# Patient Record
Sex: MALE | Race: WHITE | NOT HISPANIC OR LATINO | Employment: FULL TIME | URBAN - METROPOLITAN AREA
[De-identification: names, ages, dates, MRNs, and addresses within clinical notes are randomized per-mention and may not be internally consistent; named-entity substitution may affect disease eponyms.]

---

## 2018-09-26 ENCOUNTER — ANTICOAGULATION MONITORING (OUTPATIENT)
Dept: VASCULAR LAB | Facility: MEDICAL CENTER | Age: 38
End: 2018-09-26

## 2021-02-17 ENCOUNTER — OFFICE VISIT (OUTPATIENT)
Dept: URGENT CARE | Facility: PHYSICIAN GROUP | Age: 41
End: 2021-02-17

## 2021-02-17 ENCOUNTER — HOSPITAL ENCOUNTER (OUTPATIENT)
Facility: MEDICAL CENTER | Age: 41
End: 2021-02-18
Attending: EMERGENCY MEDICINE | Admitting: STUDENT IN AN ORGANIZED HEALTH CARE EDUCATION/TRAINING PROGRAM

## 2021-02-17 ENCOUNTER — APPOINTMENT (OUTPATIENT)
Dept: RADIOLOGY | Facility: MEDICAL CENTER | Age: 41
End: 2021-02-17
Attending: EMERGENCY MEDICINE

## 2021-02-17 VITALS
HEART RATE: 84 BPM | BODY MASS INDEX: 37.93 KG/M2 | TEMPERATURE: 98.5 F | HEIGHT: 72 IN | DIASTOLIC BLOOD PRESSURE: 90 MMHG | RESPIRATION RATE: 16 BRPM | WEIGHT: 280 LBS | OXYGEN SATURATION: 97 % | SYSTOLIC BLOOD PRESSURE: 128 MMHG

## 2021-02-17 DIAGNOSIS — M79.89 PAIN AND SWELLING OF LEFT LOWER LEG: ICD-10-CM

## 2021-02-17 DIAGNOSIS — R20.2 NUMBNESS AND TINGLING SENSATION OF SKIN: ICD-10-CM

## 2021-02-17 DIAGNOSIS — R04.0 EPISTAXIS: ICD-10-CM

## 2021-02-17 DIAGNOSIS — R51.9 INTERMITTENT HEADACHE: ICD-10-CM

## 2021-02-17 DIAGNOSIS — R20.0 NUMBNESS AND TINGLING SENSATION OF SKIN: ICD-10-CM

## 2021-02-17 DIAGNOSIS — H53.9 VISUAL DISTURBANCE: ICD-10-CM

## 2021-02-17 DIAGNOSIS — H53.9 VISUAL CHANGES: ICD-10-CM

## 2021-02-17 DIAGNOSIS — R20.0 LEFT FACIAL NUMBNESS: ICD-10-CM

## 2021-02-17 DIAGNOSIS — G43.109 COMPLICATED MIGRAINE: ICD-10-CM

## 2021-02-17 DIAGNOSIS — R51.9 NONINTRACTABLE HEADACHE, UNSPECIFIED CHRONICITY PATTERN, UNSPECIFIED HEADACHE TYPE: ICD-10-CM

## 2021-02-17 DIAGNOSIS — M79.662 PAIN AND SWELLING OF LEFT LOWER LEG: ICD-10-CM

## 2021-02-17 DIAGNOSIS — R07.9 NONSPECIFIC CHEST PAIN: ICD-10-CM

## 2021-02-17 DIAGNOSIS — R79.89 ELEVATED LFTS: ICD-10-CM

## 2021-02-17 PROBLEM — G45.9 TIA (TRANSIENT ISCHEMIC ATTACK): Status: ACTIVE | Noted: 2021-02-17

## 2021-02-17 LAB
ALBUMIN SERPL BCP-MCNC: 4.8 G/DL (ref 3.2–4.9)
ALBUMIN/GLOB SERPL: 1.8 G/DL
ALP SERPL-CCNC: 76 U/L (ref 30–99)
ALT SERPL-CCNC: 113 U/L (ref 2–50)
ANION GAP SERPL CALC-SCNC: 13 MMOL/L (ref 7–16)
APTT PPP: 30 SEC (ref 24.7–36)
AST SERPL-CCNC: 55 U/L (ref 12–45)
BASOPHILS # BLD AUTO: 0.8 % (ref 0–1.8)
BASOPHILS # BLD: 0.06 K/UL (ref 0–0.12)
BILIRUB SERPL-MCNC: 0.4 MG/DL (ref 0.1–1.5)
BUN SERPL-MCNC: 13 MG/DL (ref 8–22)
CALCIUM SERPL-MCNC: 9.5 MG/DL (ref 8.5–10.5)
CHLORIDE SERPL-SCNC: 102 MMOL/L (ref 96–112)
CO2 SERPL-SCNC: 23 MMOL/L (ref 20–33)
CREAT SERPL-MCNC: 0.94 MG/DL (ref 0.5–1.4)
EKG IMPRESSION: NORMAL
EOSINOPHIL # BLD AUTO: 0.31 K/UL (ref 0–0.51)
EOSINOPHIL NFR BLD: 3.9 % (ref 0–6.9)
ERYTHROCYTE [DISTWIDTH] IN BLOOD BY AUTOMATED COUNT: 42.5 FL (ref 35.9–50)
EST. AVERAGE GLUCOSE BLD GHB EST-MCNC: 131 MG/DL
FLUAV RNA SPEC QL NAA+PROBE: NEGATIVE
FLUBV RNA SPEC QL NAA+PROBE: NEGATIVE
GLOBULIN SER CALC-MCNC: 2.7 G/DL (ref 1.9–3.5)
GLUCOSE SERPL-MCNC: 83 MG/DL (ref 65–99)
HBA1C MFR BLD: 6.2 % (ref 0–5.6)
HCT VFR BLD AUTO: 50 % (ref 42–52)
HGB BLD-MCNC: 17.1 G/DL (ref 14–18)
IMM GRANULOCYTES # BLD AUTO: 0.03 K/UL (ref 0–0.11)
IMM GRANULOCYTES NFR BLD AUTO: 0.4 % (ref 0–0.9)
INR PPP: 0.99 (ref 0.87–1.13)
LYMPHOCYTES # BLD AUTO: 2.89 K/UL (ref 1–4.8)
LYMPHOCYTES NFR BLD: 36.8 % (ref 22–41)
MCH RBC QN AUTO: 30.1 PG (ref 27–33)
MCHC RBC AUTO-ENTMCNC: 34.2 G/DL (ref 33.7–35.3)
MCV RBC AUTO: 87.9 FL (ref 81.4–97.8)
MONOCYTES # BLD AUTO: 0.72 K/UL (ref 0–0.85)
MONOCYTES NFR BLD AUTO: 9.2 % (ref 0–13.4)
NEUTROPHILS # BLD AUTO: 3.84 K/UL (ref 1.82–7.42)
NEUTROPHILS NFR BLD: 48.9 % (ref 44–72)
NRBC # BLD AUTO: 0 K/UL
NRBC BLD-RTO: 0 /100 WBC
PLATELET # BLD AUTO: 288 K/UL (ref 164–446)
PMV BLD AUTO: 9.6 FL (ref 9–12.9)
POTASSIUM SERPL-SCNC: 4.4 MMOL/L (ref 3.6–5.5)
PROT SERPL-MCNC: 7.5 G/DL (ref 6–8.2)
PROTHROMBIN TIME: 13.4 SEC (ref 12–14.6)
RBC # BLD AUTO: 5.69 M/UL (ref 4.7–6.1)
RSV RNA SPEC QL NAA+PROBE: NEGATIVE
SARS-COV-2 RNA RESP QL NAA+PROBE: NOTDETECTED
SODIUM SERPL-SCNC: 138 MMOL/L (ref 135–145)
SPECIMEN SOURCE: NORMAL
TROPONIN T SERPL-MCNC: <6 NG/L (ref 6–19)
WBC # BLD AUTO: 7.9 K/UL (ref 4.8–10.8)

## 2021-02-17 PROCEDURE — G0378 HOSPITAL OBSERVATION PER HR: HCPCS

## 2021-02-17 PROCEDURE — 85730 THROMBOPLASTIN TIME PARTIAL: CPT

## 2021-02-17 PROCEDURE — 99285 EMERGENCY DEPT VISIT HI MDM: CPT

## 2021-02-17 PROCEDURE — 99204 OFFICE O/P NEW MOD 45 MIN: CPT | Performed by: PHYSICIAN ASSISTANT

## 2021-02-17 PROCEDURE — 70450 CT HEAD/BRAIN W/O DYE: CPT

## 2021-02-17 PROCEDURE — 93971 EXTREMITY STUDY: CPT | Mod: LT

## 2021-02-17 PROCEDURE — 85025 COMPLETE CBC W/AUTO DIFF WBC: CPT

## 2021-02-17 PROCEDURE — 83036 HEMOGLOBIN GLYCOSYLATED A1C: CPT

## 2021-02-17 PROCEDURE — 85610 PROTHROMBIN TIME: CPT

## 2021-02-17 PROCEDURE — 80053 COMPREHEN METABOLIC PANEL: CPT

## 2021-02-17 PROCEDURE — 99220 PR INITIAL OBSERVATION CARE,LEVL III: CPT | Performed by: STUDENT IN AN ORGANIZED HEALTH CARE EDUCATION/TRAINING PROGRAM

## 2021-02-17 PROCEDURE — 93971 EXTREMITY STUDY: CPT | Mod: 26,LT | Performed by: INTERNAL MEDICINE

## 2021-02-17 PROCEDURE — C9803 HOPD COVID-19 SPEC COLLECT: HCPCS | Performed by: EMERGENCY MEDICINE

## 2021-02-17 PROCEDURE — 0241U HCHG SARS-COV-2 COVID-19 NFCT DS RESP RNA 4 TRGT MIC: CPT

## 2021-02-17 PROCEDURE — 93005 ELECTROCARDIOGRAM TRACING: CPT | Performed by: EMERGENCY MEDICINE

## 2021-02-17 PROCEDURE — 84484 ASSAY OF TROPONIN QUANT: CPT

## 2021-02-17 RX ORDER — PROCHLORPERAZINE EDISYLATE 5 MG/ML
5-10 INJECTION INTRAMUSCULAR; INTRAVENOUS EVERY 4 HOURS PRN
Status: DISCONTINUED | OUTPATIENT
Start: 2021-02-17 | End: 2021-02-18 | Stop reason: HOSPADM

## 2021-02-17 RX ORDER — AMOXICILLIN 250 MG
2 CAPSULE ORAL 2 TIMES DAILY
Status: DISCONTINUED | OUTPATIENT
Start: 2021-02-17 | End: 2021-02-18 | Stop reason: HOSPADM

## 2021-02-17 RX ORDER — POLYETHYLENE GLYCOL 3350 17 G/17G
1 POWDER, FOR SOLUTION ORAL
Status: DISCONTINUED | OUTPATIENT
Start: 2021-02-17 | End: 2021-02-18 | Stop reason: HOSPADM

## 2021-02-17 RX ORDER — ONDANSETRON 2 MG/ML
4 INJECTION INTRAMUSCULAR; INTRAVENOUS EVERY 4 HOURS PRN
Status: DISCONTINUED | OUTPATIENT
Start: 2021-02-17 | End: 2021-02-18 | Stop reason: HOSPADM

## 2021-02-17 RX ORDER — BISACODYL 10 MG
10 SUPPOSITORY, RECTAL RECTAL
Status: DISCONTINUED | OUTPATIENT
Start: 2021-02-17 | End: 2021-02-18 | Stop reason: HOSPADM

## 2021-02-17 RX ORDER — PROMETHAZINE HYDROCHLORIDE 25 MG/1
12.5-25 SUPPOSITORY RECTAL EVERY 4 HOURS PRN
Status: DISCONTINUED | OUTPATIENT
Start: 2021-02-17 | End: 2021-02-18 | Stop reason: HOSPADM

## 2021-02-17 RX ORDER — PROMETHAZINE HYDROCHLORIDE 25 MG/1
12.5-25 TABLET ORAL EVERY 4 HOURS PRN
Status: DISCONTINUED | OUTPATIENT
Start: 2021-02-17 | End: 2021-02-18 | Stop reason: HOSPADM

## 2021-02-17 RX ORDER — ACETAMINOPHEN 325 MG/1
650 TABLET ORAL EVERY 6 HOURS PRN
Status: DISCONTINUED | OUTPATIENT
Start: 2021-02-17 | End: 2021-02-18 | Stop reason: HOSPADM

## 2021-02-17 RX ORDER — IBUPROFEN 800 MG/1
800 TABLET ORAL EVERY 8 HOURS PRN
COMMUNITY

## 2021-02-17 RX ORDER — ONDANSETRON 4 MG/1
4 TABLET, ORALLY DISINTEGRATING ORAL EVERY 4 HOURS PRN
Status: DISCONTINUED | OUTPATIENT
Start: 2021-02-17 | End: 2021-02-18 | Stop reason: HOSPADM

## 2021-02-17 RX ORDER — LABETALOL HYDROCHLORIDE 5 MG/ML
10 INJECTION, SOLUTION INTRAVENOUS EVERY 4 HOURS PRN
Status: DISCONTINUED | OUTPATIENT
Start: 2021-02-17 | End: 2021-02-18 | Stop reason: HOSPADM

## 2021-02-17 ASSESSMENT — ENCOUNTER SYMPTOMS
ORTHOPNEA: 0
SENSORY CHANGE: 1
BLOOD IN STOOL: 0
PHOTOPHOBIA: 0
NAUSEA: 0
MYALGIAS: 0
CONSTIPATION: 0
SENSORY CHANGE: 1
LOSS OF CONSCIOUSNESS: 0
EYE REDNESS: 0
EYE PAIN: 0
VISUAL CHANGE: 1
PALPITATIONS: 0
HEADACHES: 1
DIZZINESS: 1
SHORTNESS OF BREATH: 0
TINGLING: 1
COUGH: 0
ABDOMINAL PAIN: 0
WHEEZING: 0
FEVER: 0
EYE DISCHARGE: 0
DIARRHEA: 0
VOMITING: 0
CHILLS: 0
LOSS OF CONSCIOUSNESS: 0
WEAKNESS: 0
DIZZINESS: 0
COUGH: 0
FOCAL WEAKNESS: 0
BLURRED VISION: 1
HEADACHES: 1
WEIGHT LOSS: 0
DOUBLE VISION: 0
HEMOPTYSIS: 0
SORE THROAT: 0

## 2021-02-17 ASSESSMENT — PAIN DESCRIPTION - PAIN TYPE: TYPE: ACUTE PAIN

## 2021-02-17 NOTE — ED NOTES
Charge notified of symptomatic presentations and tentative diagnosis by UC. All symptomatic presentations outside 24hr window, UC did not definitively diagnose. Pt does not meet stroke protocol.

## 2021-02-17 NOTE — PROGRESS NOTES
Subjective:      Jack Piedra is a 40 y.o. male who presents with Headache (Frequent headache, fogginess and nose bleeds.  COVID positive the first of January.)            Patient is a 40-year-old male who presents to urgent care with several complaints today.      Patient reports remote history of a few bloody noses in particular to the left nostril.  Reports it is often instigated with a light headache followed by spontaneous bleeding through the left nare.  He reports this started approximately 1 week ago.  For the last 3 days he has become gradually more concerned as he has developed a left-sided headache that has been intermittent in nature however with associated fogginess and decreased vision to his periphery bilaterally.  He also reports during this time he has developed numbness and tingling-more numbness to his left cheek.  He denies any weakness or slurring of his words.  He also denies associated chest pain, shortness of breath.  When asked about previous history of strokes, clots etc-patient does report a remote episode of left knee pain, swelling and calf pain and swelling 3 weeks ago which subsequently improved after utilization of ibuprofen.  Currently denies any left knee or calf pain.    Headache   This is a new problem. Episode onset: 3 days ago. The problem occurs intermittently. The problem has been waxing and waning. The pain is located in the bilateral (Mostly on the left however can be bilateral) region. The pain radiates to the face. The pain quality is not similar to prior headaches. The quality of the pain is described as shooting. Associated symptoms include dizziness, tingling and a visual change. Pertinent negatives include no coughing, eye redness, phonophobia or photophobia. Exacerbated by: Head movement, leaning over. He has tried NSAIDs for the symptoms. The treatment provided mild relief.   Patient also reports prior history of COVID-19 which was diagnosed at the beginning of  January.    Review of Systems   Constitutional: Positive for malaise/fatigue.   Eyes: Negative for double vision, photophobia, discharge and redness.        Peripheral visual changes   Respiratory: Negative for cough.    Cardiovascular: Negative for chest pain.        Prior left knee and left calf swelling   Neurological: Positive for dizziness, tingling, sensory change and headaches. Negative for focal weakness and loss of consciousness.   All other systems reviewed and are negative.         Objective:     /90   Pulse 84   Temp 36.9 °C (98.5 °F)   Resp 16   Ht 1.829 m (6')   Wt (!) 127 kg (280 lb)   SpO2 97%   BMI 37.97 kg/m²    PMH:  has no past medical history on file.  MEDS: Reviewed .   ALLERGIES:   Allergies   Allergen Reactions   • Augmentin      SURGHX: No past surgical history on file.  SOCHX:  reports that he has been smoking cigarettes. He has been smoking about 0.50 packs per day. His smokeless tobacco use includes chew.  FH: Family history was reviewed, no pertinent findings to report    Physical Exam  Vitals reviewed.   Constitutional:       General: He is not in acute distress.     Appearance: He is well-developed.   HENT:      Head: Normocephalic and atraumatic.      Right Ear: External ear normal.      Left Ear: External ear normal.      Nose:      Comments: Dried blood to the left nare with noted scab to the inner septum without active bleeding.  No blood noted to posterior oropharynx.     Mouth/Throat:      Pharynx: No oropharyngeal exudate.   Eyes:      Conjunctiva/sclera: Conjunctivae normal.      Pupils: Pupils are equal, round, and reactive to light.   Neck:      Trachea: No tracheal deviation.   Cardiovascular:      Rate and Rhythm: Normal rate and regular rhythm.      Heart sounds: Murmur present.   Pulmonary:      Effort: Pulmonary effort is normal. No respiratory distress.      Breath sounds: Normal breath sounds.   Musculoskeletal:         General: Normal range of motion.       Cervical back: Normal range of motion and neck supple.   Skin:     General: Skin is warm.      Findings: No rash.   Neurological:      Mental Status: He is alert and oriented to person, place, and time.      Cranial Nerves: No facial asymmetry.      Sensory: Sensory deficit present.      Coordination: Coordination normal.      Gait: Gait normal.      Comments: Decreased sensation over the left maxillary region.Unable to elicit visual field deficit   Psychiatric:         Behavior: Behavior normal.         Thought Content: Thought content normal.         Judgment: Judgment normal.                 Assessment/Plan:        1. Intermittent headache  2. Numbness and tingling sensation of skin  3. Epistaxis  4. Visual changes    At this time patient is well-appearing, without active bleeding although remote history of epistaxis.  Currently without headache however still with slight numbness and tingling to the left maxillary region.  Reports prior history of visual changes over the last 2 to 3 days.  Without carotid bruit although patient is with murmur.  Questionable migraine, elevated transient BP,  TIA versus other intracranial abnormality-patient does not want to go back to [facility] for further evaluation at this time and is opting to pursue further evaluation in the emergency room.  I do feel that this is reasonable at this time- although symptoms started on Monday-he is to meet up with family member-friend will also accompany him to the emergency room.  Patient declines [facility] transport.  Patient was stable throughout the duration of his care today.    Please note that this dictation was created using voice recognition software. I have made every reasonable attempt to correct obvious errors, but I expect that there are errors of grammar and possibly content that I did not discover before finalizing the note.

## 2021-02-17 NOTE — ED TRIAGE NOTES
Jack Piedra  40 y.o. male  Chief Complaint   Patient presents with   • Sent from Urgent Care     For possible TIA. Pt states that he started having a headache 2/15 with nosebleeds, last nose bleed this AM. Pt states headache start on left side. Pt states that he had a swollen/painfull calve 2 weeks ago absolving approx. 3 days later. Pt states lethargic on 2/13. Pt complains of blurred vision, and hazy memory.       Pt ambulatory to triage with steady gait for above complaint.   Pt is alert and oriented, speaking in full sentences, follows commands and responds appropriately to questions. Resp are even and unlabored. No behavioral indicators of pain.   Pt placed in lobby. Pt educated on triage process. Pt encouraged to alert staff for any changes. This RN masked and in appropriate PPE during encounter.

## 2021-02-18 ENCOUNTER — APPOINTMENT (OUTPATIENT)
Dept: CARDIOLOGY | Facility: MEDICAL CENTER | Age: 41
End: 2021-02-18
Attending: STUDENT IN AN ORGANIZED HEALTH CARE EDUCATION/TRAINING PROGRAM

## 2021-02-18 ENCOUNTER — APPOINTMENT (OUTPATIENT)
Dept: RADIOLOGY | Facility: MEDICAL CENTER | Age: 41
End: 2021-02-18
Attending: STUDENT IN AN ORGANIZED HEALTH CARE EDUCATION/TRAINING PROGRAM

## 2021-02-18 VITALS
RESPIRATION RATE: 20 BRPM | BODY MASS INDEX: 40.01 KG/M2 | HEIGHT: 72 IN | TEMPERATURE: 98.1 F | SYSTOLIC BLOOD PRESSURE: 133 MMHG | OXYGEN SATURATION: 96 % | DIASTOLIC BLOOD PRESSURE: 80 MMHG | HEART RATE: 83 BPM | WEIGHT: 295.42 LBS

## 2021-02-18 PROBLEM — R73.03 PREDIABETES: Status: ACTIVE | Noted: 2021-02-18

## 2021-02-18 PROBLEM — G43.109 COMPLICATED MIGRAINE: Status: ACTIVE | Noted: 2021-02-17

## 2021-02-18 LAB
ANION GAP SERPL CALC-SCNC: 10 MMOL/L (ref 7–16)
BASOPHILS # BLD AUTO: 0.9 % (ref 0–1.8)
BASOPHILS # BLD: 0.06 K/UL (ref 0–0.12)
BUN SERPL-MCNC: 15 MG/DL (ref 8–22)
CALCIUM SERPL-MCNC: 8.7 MG/DL (ref 8.5–10.5)
CHLORIDE SERPL-SCNC: 102 MMOL/L (ref 96–112)
CHOLEST SERPL-MCNC: 140 MG/DL (ref 100–199)
CO2 SERPL-SCNC: 23 MMOL/L (ref 20–33)
CREAT SERPL-MCNC: 0.83 MG/DL (ref 0.5–1.4)
EOSINOPHIL # BLD AUTO: 0.3 K/UL (ref 0–0.51)
EOSINOPHIL NFR BLD: 4.4 % (ref 0–6.9)
ERYTHROCYTE [DISTWIDTH] IN BLOOD BY AUTOMATED COUNT: 41.9 FL (ref 35.9–50)
GLUCOSE SERPL-MCNC: 107 MG/DL (ref 65–99)
HCT VFR BLD AUTO: 46.1 % (ref 42–52)
HDLC SERPL-MCNC: 34 MG/DL
HGB BLD-MCNC: 15.9 G/DL (ref 14–18)
IMM GRANULOCYTES # BLD AUTO: 0.04 K/UL (ref 0–0.11)
IMM GRANULOCYTES NFR BLD AUTO: 0.6 % (ref 0–0.9)
LDLC SERPL CALC-MCNC: 75 MG/DL
LV EJECT FRACT  99904: 60
LV EJECT FRACT MOD 2C 99903: 67.41
LV EJECT FRACT MOD 4C 99902: 55.91
LV EJECT FRACT MOD BP 99901: 59.5
LYMPHOCYTES # BLD AUTO: 2.41 K/UL (ref 1–4.8)
LYMPHOCYTES NFR BLD: 35.5 % (ref 22–41)
MAGNESIUM SERPL-MCNC: 2.2 MG/DL (ref 1.5–2.5)
MCH RBC QN AUTO: 29.7 PG (ref 27–33)
MCHC RBC AUTO-ENTMCNC: 34.5 G/DL (ref 33.7–35.3)
MCV RBC AUTO: 86 FL (ref 81.4–97.8)
MONOCYTES # BLD AUTO: 0.65 K/UL (ref 0–0.85)
MONOCYTES NFR BLD AUTO: 9.6 % (ref 0–13.4)
NEUTROPHILS # BLD AUTO: 3.33 K/UL (ref 1.82–7.42)
NEUTROPHILS NFR BLD: 49 % (ref 44–72)
NRBC # BLD AUTO: 0 K/UL
NRBC BLD-RTO: 0 /100 WBC
PLATELET # BLD AUTO: 274 K/UL (ref 164–446)
PMV BLD AUTO: 9 FL (ref 9–12.9)
POTASSIUM SERPL-SCNC: 4 MMOL/L (ref 3.6–5.5)
RBC # BLD AUTO: 5.36 M/UL (ref 4.7–6.1)
SODIUM SERPL-SCNC: 135 MMOL/L (ref 135–145)
TRIGL SERPL-MCNC: 156 MG/DL (ref 0–149)
WBC # BLD AUTO: 6.8 K/UL (ref 4.8–10.8)

## 2021-02-18 PROCEDURE — 80061 LIPID PANEL: CPT

## 2021-02-18 PROCEDURE — 93306 TTE W/DOPPLER COMPLETE: CPT | Mod: 26 | Performed by: INTERNAL MEDICINE

## 2021-02-18 PROCEDURE — G0378 HOSPITAL OBSERVATION PER HR: HCPCS

## 2021-02-18 PROCEDURE — 700102 HCHG RX REV CODE 250 W/ 637 OVERRIDE(OP): Performed by: STUDENT IN AN ORGANIZED HEALTH CARE EDUCATION/TRAINING PROGRAM

## 2021-02-18 PROCEDURE — RXMED WILLOW AMBULATORY MEDICATION CHARGE: Performed by: NURSE PRACTITIONER

## 2021-02-18 PROCEDURE — 99217 PR OBSERVATION CARE DISCHARGE: CPT | Performed by: STUDENT IN AN ORGANIZED HEALTH CARE EDUCATION/TRAINING PROGRAM

## 2021-02-18 PROCEDURE — 80048 BASIC METABOLIC PNL TOTAL CA: CPT

## 2021-02-18 PROCEDURE — 85025 COMPLETE CBC W/AUTO DIFF WBC: CPT

## 2021-02-18 PROCEDURE — 70551 MRI BRAIN STEM W/O DYE: CPT

## 2021-02-18 PROCEDURE — A9270 NON-COVERED ITEM OR SERVICE: HCPCS | Performed by: STUDENT IN AN ORGANIZED HEALTH CARE EDUCATION/TRAINING PROGRAM

## 2021-02-18 PROCEDURE — 93306 TTE W/DOPPLER COMPLETE: CPT

## 2021-02-18 PROCEDURE — 83735 ASSAY OF MAGNESIUM: CPT

## 2021-02-18 RX ORDER — SUMATRIPTAN 25 MG/1
25-100 TABLET, FILM COATED ORAL
Qty: 30 TABLET | Refills: 3 | Status: SHIPPED
Start: 2021-02-18 | End: 2021-03-04

## 2021-02-18 RX ORDER — BUTALBITAL, ACETAMINOPHEN, CAFFEINE AND CODEINE PHOSPHATE 50; 325; 40; 30 MG/1; MG/1; MG/1; MG/1
1 CAPSULE ORAL EVERY 4 HOURS PRN
Qty: 30 CAPSULE | Refills: 0 | Status: SHIPPED | OUTPATIENT
Start: 2021-02-18 | End: 2021-02-18 | Stop reason: SDUPTHER

## 2021-02-18 RX ORDER — BUTALBITAL, ACETAMINOPHEN, CAFFEINE AND CODEINE PHOSPHATE 50; 325; 40; 30 MG/1; MG/1; MG/1; MG/1
1 CAPSULE ORAL EVERY 4 HOURS PRN
Qty: 30 CAPSULE | Refills: 0 | Status: SHIPPED | OUTPATIENT
Start: 2021-02-18 | End: 2021-02-18

## 2021-02-18 RX ADMIN — ACETAMINOPHEN 650 MG: 325 TABLET ORAL at 11:56

## 2021-02-18 ASSESSMENT — PATIENT HEALTH QUESTIONNAIRE - PHQ9
2. FEELING DOWN, DEPRESSED, IRRITABLE, OR HOPELESS: NOT AT ALL
1. LITTLE INTEREST OR PLEASURE IN DOING THINGS: NOT AT ALL
SUM OF ALL RESPONSES TO PHQ9 QUESTIONS 1 AND 2: 0

## 2021-02-18 ASSESSMENT — FIBROSIS 4 INDEX: FIB4 SCORE: 0.74

## 2021-02-18 ASSESSMENT — LIFESTYLE VARIABLES
ON A TYPICAL DAY WHEN YOU DRINK ALCOHOL HOW MANY DRINKS DO YOU HAVE: 0
HOW MANY TIMES IN THE PAST YEAR HAVE YOU HAD 5 OR MORE DRINKS IN A DAY: 0
EVER HAD A DRINK FIRST THING IN THE MORNING TO STEADY YOUR NERVES TO GET RID OF A HANGOVER: NO
EVER FELT BAD OR GUILTY ABOUT YOUR DRINKING: NO
HAVE YOU EVER FELT YOU SHOULD CUT DOWN ON YOUR DRINKING: NO
CONSUMPTION TOTAL: NEGATIVE
ALCOHOL_USE: NO
AVERAGE NUMBER OF DAYS PER WEEK YOU HAVE A DRINK CONTAINING ALCOHOL: 0
TOTAL SCORE: 0
TOTAL SCORE: 0
DOES PATIENT WANT TO STOP DRINKING: NO
TOTAL SCORE: 0
HAVE PEOPLE ANNOYED YOU BY CRITICIZING YOUR DRINKING: NO

## 2021-02-18 ASSESSMENT — PAIN DESCRIPTION - PAIN TYPE
TYPE: ACUTE PAIN
TYPE: ACUTE PAIN

## 2021-02-18 NOTE — DISCHARGE INSTRUCTIONS
Discharge Instructions    Discharged to home by car with relative. Discharged via wheelchair, hospital escort: Yes.  Special equipment needed: Not Applicable    Be sure to schedule a follow-up appointment with your primary care doctor or any specialists as instructed.     Discharge Plan:   Diet Plan: Discussed  Activity Level: Discussed  Confirmed Follow up Appointment: Patient to Call and Schedule Appointment  Confirmed Symptoms Management: Discussed  Medication Reconciliation Updated: No (Comments)  Influenza Vaccine Indication: Patient Refuses    I understand that a diet low in cholesterol, fat, and sodium is recommended for good health. Unless I have been given specific instructions below for another diet, I accept this instruction as my diet prescription.   Other diet: Regular    Special Instructions: None    · Is patient discharged on Warfarin / Coumadin?   No     Depression / Suicide Risk    As you are discharged from [facility], it is important to learn how to keep safe from harming yourself.    Recognize the warning signs:  · Abrupt changes in personality, positive or negative- including increase in energy   · Giving away possessions  · Change in eating patterns- significant weight changes-  positive or negative  · Change in sleeping patterns- unable to sleep or sleeping all the time   · Unwillingness or inability to communicate  · Depression  · Unusual sadness, discouragement and loneliness  · Talk of wanting to die  · Neglect of personal appearance   · Rebelliousness- reckless behavior  · Withdrawal from people/activities they love  · Confusion- inability to concentrate     If you or a loved one observes any of these behaviors or has concerns about self-harm, here's what you can do:  · Talk about it- your feelings and reasons for harming yourself  · Remove any means that you might use to hurt yourself (examples: pills, rope, extension cords, firearm)  · Get professional help from the  community (Mental Health, Substance Abuse, psychological counseling)  · Do not be alone:Call your Safe Contact- someone whom you trust who will be there for you.  · Call your local CRISIS HOTLINE 033-4092 or 797-845-7538  · Call your local Children's Mobile Crisis Response Team [place] (612) 535-2356 or www.[employer]  · Call the toll free National Suicide Prevention Hotlines   · National Suicide Prevention Lifeline 926-987-WIAA (6094)  · National Hope Line Network 800-SUICIDE (222-1756)

## 2021-02-18 NOTE — ED NOTES
Med Rec completed per patient at bedside  Allergies reviewed:NKDA  No ORAL antibiotics in last 14 days    Patient reports no prescription or OTC vitamins.    Patient did report he is prescribed ibu 800 mg TID PRN but seldomly takes because he does not like the way it makes him feel however he did have one dose today.

## 2021-02-18 NOTE — ED NOTES
"Patient to desk taking blood pressure cuff off stating that he is leaving and going someplace else because he's been waiting.  I apologized for the delay and he cut me off mid sentence and said \"My head is killing me and I can taste blood.  I am leaving\"  When asked to please sign form that he left before he saw a dr he waved his hand at me and walked off.   " Statement Selected

## 2021-02-18 NOTE — DISCHARGE SUMMARY
Discharge Summary    CHIEF COMPLAINT ON ADMISSION  Chief Complaint   Patient presents with   • Sent from Urgent Care     For possible TIA. Pt states that he started having a headache 2/15 with nosebleeds, last nose bleed this AM. Pt states headache start on left side. Pt states that he had a swollen/painfull calve 2 weeks ago absolving approx. 3 days later. Pt states lethargic on 2/13. Pt complains of blurred vision, and hazy memory.       Reason for Admission  Sent by      Admission Date  2/17/2021    CODE STATUS  Full Code    HPI & HOSPITAL COURSE  This is a 40 y.o. male with no significant past medical history here with complaints of persistent headache with intermittent blurry vision for approximately 5 days prior to admission.  He originally presented to urgent care and was recommended to present to ED for concern of possible TIA.  The patient also had additional complaints of left lower extremity swelling for approximately 1 week.    On admission CT head and MRI brain were negative for acute intracranial abnormalities.  Echocardiogram revealed an LVEF of 60% with no acute abnormalities.  At this time TIA is not suspected.  He is likely experiencing complicated migraines.  At this time his symptoms are resolved.  He will be prescribed Fioricet as needed and will have a neurology outpatient referral for further management of migraines.    He underwent left lower extremity ultrasound which was negative for DVT.  There does not appear to be infection of the left leg.  Differential diagnosis could include venous insufficiency.  This can be further worked up as an outpatient as there is not appear to be an acute issue at this time.    He was also noted to have prediabetes with hemoglobin A1c of 6.2.  We will not start medication for this at this time.  He is recommended to undergo lifestyle changes including weight loss and diet modification.  Further recommendations can be made per outpatient PCP.    At this time  the patient has no further need for acute intervention.  He is safe for discharge home with outpatient follow-up.    Therefore, he is discharged in good and stable condition to home with close outpatient follow-up.    Discharge Date  2/18/2021    FOLLOW UP ITEMS POST DISCHARGE  -Take Fioricet as needed for migraines.  -Follow with neurology in the outpatient setting for migraine treatment.  -Recommend lifestyle changes of weight loss and diet modifications for prediabetes.  Follow with PCP for further recommendations.    DISCHARGE DIAGNOSES  Principal Problem:    Complicated migraine POA: Yes  Active Problems:    Left leg swelling POA: Yes    Prediabetes POA: Unknown  Resolved Problems:    * No resolved hospital problems. *      FOLLOW UP  Follow with PCP 1 to 2 weeks.    MEDICATIONS ON DISCHARGE     Medication List      START taking these medications      Instructions   butalbital-acetaminophen-caffeine-codeine -79-30 MG per capsule  Commonly known as: FIORICET W/CODEINE   Take 1 capsule by mouth every four hours as needed for Migraine for up to 30 days.  Dose: 1 capsule        CONTINUE taking these medications      Instructions   ibuprofen 800 MG Tabs  Commonly known as: MOTRIN   Take 800 mg by mouth every 8 hours as needed (pain).  Dose: 800 mg            Allergies  Allergies   Allergen Reactions   • Augmentin Unspecified     unknown       DIET  Orders Placed This Encounter   Procedures   • Diet Order Diet: Regular     Standing Status:   Standing     Number of Occurrences:   1     Order Specific Question:   Diet:     Answer:   Regular [1]       ACTIVITY  As tolerated.  Weight bearing as tolerated    LABORATORY  Lab Results   Component Value Date    SODIUM 135 02/18/2021    POTASSIUM 4.0 02/18/2021    CHLORIDE 102 02/18/2021    CO2 23 02/18/2021    GLUCOSE 107 (H) 02/18/2021    BUN 15 02/18/2021    CREATININE 0.83 02/18/2021        Lab Results   Component Value Date    WBC 6.8 02/18/2021    HEMOGLOBIN 15.9  02/18/2021    HEMATOCRIT 46.1 02/18/2021    PLATELETCT 274 02/18/2021

## 2021-02-18 NOTE — ED NOTES
Pt ambulated to blue 21. Steady gait. Hooked up to monitor. Labs sent.    Pt pleasant and cooperative in regards to POC.

## 2021-02-18 NOTE — ASSESSMENT & PLAN NOTE
Left lower extremity swelling since Covid diagnosis  No signs of DVT on ultrasound imaging  No obvious signs of infection on physical examination  Minimal pain on palpation  Patient reports is improving  Unknown cause of swelling at this time  -Continue with ibuprofen  -Monitor    VTE: SCDs, Lovenox

## 2021-02-18 NOTE — ASSESSMENT & PLAN NOTE
Persistent headaches since Saturday, with associated vision and sensory change  No prior history of CVA in the past, denies any current comorbidities  Denies any prior history of headaches or migraines in the past  CT imaging without significant findings  Unclear if possible TIA versus atypical migraines  -F/U echo  -F/U MRI brain  -Order A1c and lipid panel  -Telemetry monitor  -Neurochecks every 4 hours

## 2021-02-18 NOTE — CARE PLAN
Problem: Safety:  Goal: Will remain free from injury  Outcome: PROGRESSING AS EXPECTED     Problem: Safety:  Goal: Risk of aspiration will decrease  Outcome: PROGRESSING AS EXPECTED     Call light and personal belongings within reach. Pt denies any needs at this time. Will continue to monitor.

## 2021-02-18 NOTE — ED PROVIDER NOTES
ED Provider Note     Scribed for Nicole Corea D.O. by Funmi Somers. 2/17/2021, 5:52 PM.     Primary care provider: Pcp Pt States None  Means of arrival: Walk in         History obtained from: Patient   History limited by: None    CHIEF COMPLAINT  Chief Complaint   Patient presents with   • Sent from Urgent Care     For possible TIA. Pt states that he started having a headache 2/15 with nosebleeds, last nose bleed this AM. Pt states headache start on left side. Pt states that he had a swollen/painfull calve 2 weeks ago absolving approx. 3 days later. Pt states lethargic on 2/13. Pt complains of blurred vision, and hazy memory.       HPI  Jack Piedra is a 40 y.o. male who presents to the emergency Department for evaluation from urgent care. Patient reports he began having headaches on Saturday localized behind his left ear. During the headaches his vision appears fuzzy and diminished. Patient has additional complaints of lethargy and left sided facial numbness. He also reports he has been having episodes of epistaxis that began Monday. 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 While driving today to be seen patient had another episode of headache and nose bleed. He denies any other nasal drainage, or tingling or weakness in his extremities. He reports some chest pain when he left urgent care today but states it is probably related to anxiety. Patient is not anticoagulated.     Patient has an additional concern for left leg symptoms. Patient reports he was Covid positive Jan 4th. Only covid symptoms were body aches loss of taste and smell and resolved with no problem. One week later he developed pain behind his left knee with swelling and calf tenderness. He sought medical care but they wanted to refer him to orthopedics. Patient took ibuprofen and after a few days the swelling resolved on its own. He denies any recent surgery or long sedentary periods.  Patient denies a history of seizure, cancer, stoke, heart issues, diabetes. Reports great grandfather dies of stroke at old age.     REVIEW OF SYSTEMS  Pertinent positives include headache, vision changes, nose bleed, left knee pain and swelling, left calf tenderness. Pertinent negatives include no nasal drainage, or tingling or weakness in his extremities.   See HPI for further details. All other systems are negative.    PAST MEDICAL HISTORY  History reviewed. No pertinent past medical history.    FAMILY HISTORY  History reviewed. No pertinent family history.    SOCIAL HISTORY  Social History     Tobacco Use   • Smoking status: Current Every Day Smoker     Packs/day: 0.50     Types: Cigarettes   • Smokeless tobacco: Current User     Types: Chew   Substance Use Topics   • Alcohol use: Not on file   • Drug use: Not on file      Social History     Substance and Sexual Activity   Drug Use Not on file       SURGICAL HISTORY  History reviewed. No pertinent surgical history.    CURRENT MEDICATIONS    Current Facility-Administered Medications:   •  senna-docusate (PERICOLACE or SENOKOT S) 8.6-50 MG per tablet 2 tablet, 2 tablet, Oral, BID **AND** polyethylene glycol/lytes (MIRALAX) PACKET 1 Packet, 1 Packet, Oral, QDAY PRN **AND** magnesium hydroxide (MILK OF MAGNESIA) suspension 30 mL, 30 mL, Oral, QDAY PRN **AND** bisacodyl (DULCOLAX) suppository 10 mg, 10 mg, Rectal, QDAY PRN, Misha Angeles M.D.  •  [START ON 2/18/2021] enoxaparin (LOVENOX) inj 40 mg, 40 mg, Subcutaneous, DAILY, Misha Angeles M.D.  •  acetaminophen (Tylenol) tablet 650 mg, 650 mg, Oral, Q6HRS PRN, Misha Angeles M.D.  •  labetalol (NORMODYNE/TRANDATE) injection 10 mg, 10 mg, Intravenous, Q4HRS PRN, Misha Angeles M.D.  •  ondansetron (ZOFRAN) syringe/vial injection 4 mg, 4 mg, Intravenous, Q4HRS PRN, Misha Angeles M.D.  •  ondansetron (ZOFRAN ODT) dispertab 4 mg, 4 mg, Oral, Q4HRS PRN, Misha Angeles M.D.  •  promethazine (PHENERGAN) tablet 12.5-25 mg, 12.5-25 mg, Oral,  Q4HRS PRN, Misha Angeles M.D.  •  promethazine (PHENERGAN) suppository 12.5-25 mg, 12.5-25 mg, Rectal, Q4HRS PRN, Misha Angeles M.D.  •  prochlorperazine (COMPAZINE) injection 5-10 mg, 5-10 mg, Intravenous, Q4HRS PRN, Misha Angeles M.D.    Current Outpatient Medications:   •  ibuprofen (MOTRIN) 800 MG Tab, Take 800 mg by mouth every 8 hours as needed (pain)., Disp: , Rfl:     ALLERGIES  Allergies   Allergen Reactions   • Augmentin Unspecified     unknown       PHYSICAL EXAM  VITAL SIGNS: /110   Pulse 83   Temp 36.1 °C (96.9 °F) (Temporal)   Resp 14   Ht 1.829 m (6')   Wt (!) 134 kg (294 lb 15.6 oz)   SpO2 94%   BMI 40.01 kg/m²   Constitutional: Patient is well developed, well nourished. Mild distress.   HENT: Normocephalic, atraumatic. Moist mucous membranes.    Eyes: PERRL, EOMI   Neck: Supple . Normal range of motion in flexion, extension and lateral rotation. No tenderness along the bony prominences or paraspinal muscles.  Lymphatic: No lymphadenopathy noted.   Cardiovascular: Normal heart rate and Regular rhythm. No murmur  Thorax & Lungs: Clear and equal breath sounds with good excursion. No respiratory distress, no rhonchi, wheezing or rales.   Abdomen: Bowel sounds normal in all four quadrants. Soft,nontender, no rebound , guarding, palpable masses.   Skin: Warm, Dry   Back: No cervical, thoracic, or lumbosacral tenderness.   Extremities: Peripheral pulses 4/4 left leg with 3+ edema, nonpitting, no erythema or warmth.  Positive calf tenderness.  Musculoskeletal: Normal range of motion in all major joints.   Neurologic: Alert & oriented x 3, Normal motor function, Normal sensory function, DTR's 4/4 bilaterally. Normal grasp, biceps, triceps, quadricepts, extensor hallucis longus, ankle plantar flexion. Subjective left facial paraesthesia. No facial droop.    Psychiatric: Affect normal, Judgment normal, Mood normal.     DIAGNOSTICS/PROCEDURES    LABS  Results for orders placed or performed during the  hospital encounter of 02/17/21   CBC WITH DIFFERENTIAL   Result Value Ref Range    WBC 7.9 4.8 - 10.8 K/uL    RBC 5.69 4.70 - 6.10 M/uL    Hemoglobin 17.1 14.0 - 18.0 g/dL    Hematocrit 50.0 42.0 - 52.0 %    MCV 87.9 81.4 - 97.8 fL    MCH 30.1 27.0 - 33.0 pg    MCHC 34.2 33.7 - 35.3 g/dL    RDW 42.5 35.9 - 50.0 fL    Platelet Count 288 164 - 446 K/uL    MPV 9.6 9.0 - 12.9 fL    Neutrophils-Polys 48.90 44.00 - 72.00 %    Lymphocytes 36.80 22.00 - 41.00 %    Monocytes 9.20 0.00 - 13.40 %    Eosinophils 3.90 0.00 - 6.90 %    Basophils 0.80 0.00 - 1.80 %    Immature Granulocytes 0.40 0.00 - 0.90 %    Nucleated RBC 0.00 /100 WBC    Neutrophils (Absolute) 3.84 1.82 - 7.42 K/uL    Lymphs (Absolute) 2.89 1.00 - 4.80 K/uL    Monos (Absolute) 0.72 0.00 - 0.85 K/uL    Eos (Absolute) 0.31 0.00 - 0.51 K/uL    Baso (Absolute) 0.06 0.00 - 0.12 K/uL    Immature Granulocytes (abs) 0.03 0.00 - 0.11 K/uL    NRBC (Absolute) 0.00 K/uL   COMP METABOLIC PANEL   Result Value Ref Range    Sodium 138 135 - 145 mmol/L    Potassium 4.4 3.6 - 5.5 mmol/L    Chloride 102 96 - 112 mmol/L    Co2 23 20 - 33 mmol/L    Anion Gap 13.0 7.0 - 16.0    Glucose 83 65 - 99 mg/dL    Bun 13 8 - 22 mg/dL    Creatinine 0.94 0.50 - 1.40 mg/dL    Calcium 9.5 8.5 - 10.5 mg/dL    AST(SGOT) 55 (H) 12 - 45 U/L    ALT(SGPT) 113 (H) 2 - 50 U/L    Alkaline Phosphatase 76 30 - 99 U/L    Total Bilirubin 0.4 0.1 - 1.5 mg/dL    Albumin 4.8 3.2 - 4.9 g/dL    Total Protein 7.5 6.0 - 8.2 g/dL    Globulin 2.7 1.9 - 3.5 g/dL    A-G Ratio 1.8 g/dL   APTT   Result Value Ref Range    APTT 30.0 24.7 - 36.0 sec   PROTHROMBIN TIME (INR)   Result Value Ref Range    PT 13.4 12.0 - 14.6 sec    INR 0.99 0.87 - 1.13   ESTIMATED GFR   Result Value Ref Range    GFR If African American >60 >60 mL/min/1.73 m 2    GFR If Non African American >60 >60 mL/min/1.73 m 2   COV-2, FLU A/B, AND RSV BY PCR (2-4 HOURS CEPHEID): Collect NP swab in VTM    Specimen: Nasopharyngeal; Respirate   Result Value  Ref Range    SARS-CoV-2 Source NP Swab    EKG (NOW)   Result Value Ref Range    Report       [facility] Emergency Dept.    Test Date:  2021  Pt Name:    ABUNDIO ELIZABETH                 Department: ER  MRN:        [id number]                      Room:        21  Gender:     Male                         Technician: [id number]  :        1980                   Requested By:AVRIL QUINTERO  Order #:    [id number]                    Reading MD:    Measurements  Intervals                                Axis  Rate:       79                           P:          54  WY:         180                          QRS:        41  QRSD:       81                           T:          38  QT:         377  QTc:        433    Interpretive Statements  Sinus rhythm  No previous ECG available for comparison       Labs reviewed by me    EKG  12 lead EKG interpreted by me as above.     RADIOLOGY/PROCEDURES  CT-HEAD W/O   Final Result      No CT evidence of acute infarct, hemorrhage or mass.      US-EXTREMITY VENOUS LOWER UNILAT LEFT   Final Result      MR-BRAIN-W/O    (Results Pending)   EC-ECHOCARDIOGRAM COMPLETE W/O CONT    (Results Pending)       Results and radiologist interpretation reviewed by me.     COURSE & MEDICAL DECISION MAKING  Pertinent Labs & Imaging studies reviewed. (See chart for details)    5:52 PM - Patient seen and evaluated at bedside. Ordered for CT Head, US Extremity venus lower left, APTT, Prothrombin time, CBC w/ differential, CMP, Estimated GFR to evaluate. Differential diagnoses include, but are not limited to, sinus infection, post covid clotting disorder, CVA, TIA, anxiety    7:49 PM Patient was reevaluated at bedside.  He was noted to have elevated liver enzymes.  He denies heavy alcohol consumption, right sided abdominal pain, or greasy food intolerance. Discussed lab and radiology results with the patient and informed them that his liver enzymes are elevated and no sign of blood clot but  he does have fluid. I let him know his CT was unconcerning. I let him know that I would like to admit him for further work up and MRI. He verbalizes agreement to the plan for admit. Ordered EKG, Troponin.   EKG shows a normal sinus rhythm at a rate of 79 bpm with no acute ST elevation or depression.  There is no A-V dissociation, QT prolongation or axis deviation.  No old EKGs for comparison.  8:40 PM I discussed the patient's case and the above findings with Dr. Angeles (Hospitalist) who agreed to evaluate the patient for admit.         DISPOSITION:  Patient will be hospitalized by Dr. Angeles (Hospitalist) in guarded condition.    FINAL IMPRESSION  1. Left facial numbness    2. Nonintractable headache, unspecified chronicity pattern, unspecified headache type    3. Epistaxis    4. Pain and swelling of left lower leg    5. Elevated LFTs    6. Visual disturbance         Funmi MIRAMONTES (Scribneyda), am scribing for, and in the presence of, Nicole Corea D.O..    Electronically signed by: Funmi Somers (Scribe), 2/17/2021    INicole D.O. personally performed the services described in this documentation, as scribed by Funmi Somers in my presence, and it is both accurate and complete.    C    The note accurately reflects work and decisions made by me.  Nicole Corea D.O.  2/18/2021  12:55 AM

## 2021-02-18 NOTE — H&P
[facility] Medicine History & Physical Note    Date of Service  2/17/2021    Primary Care Physician  Pcp Pt States None    Consultants  None    Code Status  Full Code    Chief Complaint  Chief Complaint   Patient presents with   • Sent from Urgent Care     For possible TIA. Pt states that he started having a headache 2/15 with nosebleeds, last nose bleed this AM. Pt states headache start on left side. Pt states that he had a swollen/painfull calve 2 weeks ago absolving approx. 3 days later. Pt states lethargic on 2/13. Pt complains of blurred vision, and hazy memory.       History of Presenting Illness  40 y.o. male who presented 2/17/2021 after being sent from urgent care for evaluation of possible TIA.  Patient reports he has been having headaches since Saturday that is localized mostly behind his left ear, but often travels to his left parietal region.  He reports that the pain ranges anywhere from 0-7/10 and comes and goes.  He states that the pain has been more persistent today, and has not gone away.  Additionally reports that when he has his headaches his vision becomes blurry and he has to focus to read any letters or words.  Patient also noted to have left-sided facial numbness that has happened a few times recently, and states that the left side his face feels heavy.  He denies having any symptoms before, denies any other focal neuro deficits at this time.  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  He also reports that he has been having episodes of epistaxis since Monday which she has never had before.  Additionally, patient reports that he has new onset left lower extremity swelling from his thigh down to his knee, he was seen by his PCP who prescribed him ibuprofen was some improvement of his symptoms, but the swelling has persisted.  Denies any erythema, rash, pain.  Denies any recent trauma.  Of note, patient had Covid on  January 4 with body aches and loss of taste and smell which have now resolved.  Patient reports his swelling began 1 week after he recovered.    ED: Vitals unremarkable.  CBC unremarkable, AST 55, , INR 0.99.  US LLE with edema from thigh to below knee, otherwise no signs of DVT.  CT head unremarkable.  EKG with NSR.    Review of Systems  Review of Systems   Constitutional: Positive for malaise/fatigue. Negative for chills, fever and weight loss.   HENT: Negative for hearing loss and sore throat.    Eyes: Positive for blurred vision. Negative for pain.   Respiratory: Negative for cough, hemoptysis, shortness of breath and wheezing.    Cardiovascular: Positive for chest pain (indigestion) and leg swelling (Left thigh). Negative for palpitations and orthopnea.   Gastrointestinal: Negative for abdominal pain, blood in stool, constipation, diarrhea, nausea and vomiting.   Genitourinary: Negative for dysuria and hematuria.   Musculoskeletal: Negative for joint pain and myalgias.   Skin: Negative for rash.   Neurological: Positive for sensory change (Left-sided facial numbness) and headaches. Negative for dizziness, loss of consciousness and weakness.       Past Medical History  Denies any past medical history    Surgical History  Left ACL repair    Family History  No pertinent family history    Social History   reports that he has been smoking cigarettes. He has been smoking about 0.50 packs per day. His smokeless tobacco use includes chew.    Allergies  Allergies   Allergen Reactions   • Augmentin Unspecified     unknown       Medications  Prior to Admission Medications   Prescriptions Last Dose Informant Patient Reported? Taking?   ibuprofen (MOTRIN) 800 MG Tab 2/17/2021 at 0730 Patient Yes Yes   Sig: Take 800 mg by mouth every 8 hours as needed (pain).      Facility-Administered Medications: None       Physical Exam  Temp:  [36.1 °C (96.9 °F)] 36.1 °C (96.9 °F)  Pulse:  [75-83] 79  Resp:  [14-21] 21  BP:  (131-163)/() 153/86  SpO2:  [94 %-99 %] 96 %    Physical Exam  Vitals and nursing note reviewed.   Constitutional:       General: He is not in acute distress.     Appearance: Normal appearance. He is obese.   HENT:      Mouth/Throat:      Mouth: Mucous membranes are moist.   Eyes:      Extraocular Movements: Extraocular movements intact.   Cardiovascular:      Rate and Rhythm: Normal rate and regular rhythm.      Heart sounds: No murmur. No gallop.    Pulmonary:      Effort: Pulmonary effort is normal.      Breath sounds: Normal breath sounds. No wheezing, rhonchi or rales.   Abdominal:      General: Abdomen is flat. Bowel sounds are normal. There is no distension.      Palpations: Abdomen is soft.      Tenderness: There is no abdominal tenderness.   Musculoskeletal:         General: Swelling (Left thigh to knee) present. No deformity. Normal range of motion.      Right lower leg: No edema.      Left lower leg: No edema.   Skin:     Findings: No bruising, erythema, lesion or rash.   Neurological:      General: No focal deficit present.      Mental Status: He is alert and oriented to person, place, and time.         Laboratory:  Recent Labs     02/17/21  1740   WBC 7.9   RBC 5.69   HEMOGLOBIN 17.1   HEMATOCRIT 50.0   MCV 87.9   MCH 30.1   MCHC 34.2   RDW 42.5   PLATELETCT 288   MPV 9.6     Recent Labs     02/17/21  1740   SODIUM 138   POTASSIUM 4.4   CHLORIDE 102   CO2 23   GLUCOSE 83   BUN 13   CREATININE 0.94   CALCIUM 9.5     Recent Labs     02/17/21  1740   ALTSGPT 113*   ASTSGOT 55*   ALKPHOSPHAT 76   TBILIRUBIN 0.4   GLUCOSE 83     Recent Labs     02/17/21  1740   APTT 30.0   INR 0.99     No results for input(s): NTPROBNP in the last 72 hours.      No results for input(s): TROPONINT in the last 72 hours.    Imaging:  CT-HEAD W/O   Final Result      No CT evidence of acute infarct, hemorrhage or mass.      US-EXTREMITY VENOUS LOWER UNILAT LEFT   Final Result      MR-BRAIN-W/O    (Results Pending)    EC-ECHOCARDIOGRAM COMPLETE W/O CONT    (Results Pending)         Assessment/Plan:  I anticipate this patient is appropriate for observation status at this time.    * TIA (transient ischemic attack)- (present on admission)  Assessment & Plan  Persistent headaches since Saturday, with associated vision and sensory change  No prior history of CVA in the past, denies any current comorbidities  Denies any prior history of headaches or migraines in the past  CT imaging without significant findings  Unclear if possible TIA versus atypical migraines  -F/U echo  -F/U MRI brain  -Order A1c and lipid panel  -Telemetry monitor  -Neurochecks every 4 hours      Left leg swelling- (present on admission)  Assessment & Plan  Left lower extremity swelling since Covid diagnosis  No signs of DVT on ultrasound imaging  No obvious signs of infection on physical examination  Minimal pain on palpation  Patient reports is improving  Unknown cause of swelling at this time  -Continue with ibuprofen  -Monitor    VTE: SCDs, Lovenox

## 2021-02-19 ENCOUNTER — PATIENT OUTREACH (OUTPATIENT)
Dept: HEALTH INFORMATION MANAGEMENT | Facility: OTHER | Age: 41
End: 2021-02-19

## 2021-02-19 ENCOUNTER — PHARMACY VISIT (OUTPATIENT)
Dept: PHARMACY | Facility: MEDICAL CENTER | Age: 41
End: 2021-02-19
Payer: COMMERCIAL

## 2021-02-19 NOTE — PROGRESS NOTES
Community Health Worker Intake  • Social determinates of health intake complete.   • Identified no barriers.  • Contact information provided to Jack Piedra.  • Outpatient assessment completed.  • Did the patient receive medications post discharge: Yes    CHW Sergei spoke with Jack via TC to follow up post discharge and reintroduce CCM services. Reviewed and discussed AVS. Pt lives in [place]. No PCP. Educated pt on the importance of follow ups with PCP. CHW provided [facility] info. Jack reports he can come to [place] for PCP appt. CHW provided contact info for neurology as well. Jack reports he will call [facility] on Monday 2/22. Jack reports no transportation issues getting to appts. He is an active  and can have spouse provide as well. Pt has no insurance. Jack reports he does not qualify for medical plans due to high income. Jack declined to speak with CCM RN for health education, questions, and concerns. Jack reports he feels confident in managing his health post discharge. Jack reports no other needs at this time.    Plan: CHW will follow up with Jack on 2/23 to inquire if PCP and neurology appt has been made.

## 2021-02-19 NOTE — DISCHARGE PLANNING
Meds-to-Beds: Discharge prescription orders listed below delivered to patient's bedside. RN Vicky notified. Patient and his wife counseled. Patient elected to have co-payment billed to patient account.      Jack Piedra   Home Medication Instructions ISMAEL:[id number]    Printed on:02/18/21 [id number]   Medication Information                      SUMAtriptan (IMITREX) 25 MG Tab tablet  Take 1-4 Tablets by mouth one time as needed for Migraine for up to 1 dose.                 Eunice Gonzalez, PharmD

## 2021-02-19 NOTE — PROGRESS NOTES
Discharge instructions, medications and follow-up reviewed with pt, pt verbalized understanding and denies questions. Discharge paperwork and prescriptions given to pt. PIV removed, TeleBox removed, armband removed. Pt decline hospital escort and wheelchair. Pt walk off unit with his wife.

## 2021-02-23 ENCOUNTER — PATIENT OUTREACH (OUTPATIENT)
Dept: HEALTH INFORMATION MANAGEMENT | Facility: OTHER | Age: 41
End: 2021-02-23

## 2021-02-23 SDOH — ECONOMIC STABILITY: INCOME INSECURITY: HOW HARD IS IT FOR YOU TO PAY FOR THE VERY BASICS LIKE FOOD, HOUSING, MEDICAL CARE, AND HEATING?: NOT HARD AT ALL

## 2021-02-23 SDOH — ECONOMIC STABILITY: FOOD INSECURITY: WITHIN THE PAST 12 MONTHS, THE FOOD YOU BOUGHT JUST DIDN'T LAST AND YOU DIDN'T HAVE MONEY TO GET MORE.: NEVER TRUE

## 2021-02-23 SDOH — ECONOMIC STABILITY: FOOD INSECURITY: WITHIN THE PAST 12 MONTHS, YOU WORRIED THAT YOUR FOOD WOULD RUN OUT BEFORE YOU GOT MONEY TO BUY MORE.: NEVER TRUE

## 2021-02-23 SDOH — ECONOMIC STABILITY: TRANSPORTATION INSECURITY
IN THE PAST 12 MONTHS, HAS THE LACK OF TRANSPORTATION KEPT YOU FROM MEDICAL APPOINTMENTS OR FROM GETTING MEDICATIONS?: NO

## 2021-02-23 SDOH — ECONOMIC STABILITY: TRANSPORTATION INSECURITY
IN THE PAST 12 MONTHS, HAS LACK OF TRANSPORTATION KEPT YOU FROM MEETINGS, WORK, OR FROM GETTING THINGS NEEDED FOR DAILY LIVING?: NO

## 2021-02-23 NOTE — PROGRESS NOTES
WILNER Mai spoke with Jack via TC to follow up regarding neurology and PCP appt. He contacted neurology office but was informed the referral needs to be approved. CHW advised him to reach out again sometime this week if he does not hear from them. He did not reach out to [facility] to establish with a PCP. He reports he would like to follow up with Neurology first. Jack lives in [place] and would like all appts to be in one day. Informed Jack he can establish with PCP at [facility] as well if he can do self pay. Jack reports no other needs at this time. Encouraged him to reach out to me when needed and for assistance with appts. Jack agreed. He will be d/c from CCM services at this time.

## 2021-03-04 ENCOUNTER — OFFICE VISIT (OUTPATIENT)
Dept: NEUROLOGY | Facility: MEDICAL CENTER | Age: 41
End: 2021-03-04
Attending: NURSE PRACTITIONER

## 2021-03-04 VITALS
DIASTOLIC BLOOD PRESSURE: 78 MMHG | SYSTOLIC BLOOD PRESSURE: 126 MMHG | HEIGHT: 72 IN | OXYGEN SATURATION: 97 % | HEART RATE: 83 BPM | BODY MASS INDEX: 39.3 KG/M2 | WEIGHT: 290.13 LBS | RESPIRATION RATE: 14 BRPM | TEMPERATURE: 98.7 F

## 2021-03-04 DIAGNOSIS — G43.109 COMPLICATED MIGRAINE: ICD-10-CM

## 2021-03-04 DIAGNOSIS — R73.03 PREDIABETES: ICD-10-CM

## 2021-03-04 PROCEDURE — 99211 OFF/OP EST MAY X REQ PHY/QHP: CPT | Performed by: NURSE PRACTITIONER

## 2021-03-04 PROCEDURE — 99204 OFFICE O/P NEW MOD 45 MIN: CPT | Performed by: NURSE PRACTITIONER

## 2021-03-04 RX ORDER — PROPRANOLOL HCL 60 MG
60 CAPSULE, EXTENDED RELEASE 24HR ORAL DAILY
Qty: 90 CAPSULE | Refills: 3 | Status: SHIPPED | OUTPATIENT
Start: 2021-03-04 | End: 2021-03-30 | Stop reason: SDUPTHER

## 2021-03-04 RX ORDER — SUMATRIPTAN 100 MG/1
TABLET, FILM COATED ORAL
Qty: 9 TABLET | Refills: 11 | Status: SHIPPED | OUTPATIENT
Start: 2021-03-04 | End: 2021-03-30

## 2021-03-04 ASSESSMENT — ENCOUNTER SYMPTOMS
COUGH: 0
NECK PAIN: 0
DIZZINESS: 1
VOMITING: 0
HEARTBURN: 0
FEVER: 0
HEADACHES: 1
CHILLS: 1
BACK PAIN: 0
TINGLING: 1
BRUISES/BLEEDS EASILY: 0
NERVOUS/ANXIOUS: 0
FOCAL WEAKNESS: 1
NAUSEA: 1
DEPRESSION: 0
SENSORY CHANGE: 1
BLURRED VISION: 1
ROS SKIN COMMENTS: RASH ON THIGHS

## 2021-03-04 ASSESSMENT — PATIENT HEALTH QUESTIONNAIRE - PHQ9: CLINICAL INTERPRETATION OF PHQ2 SCORE: 0

## 2021-03-04 ASSESSMENT — FIBROSIS 4 INDEX: FIB4 SCORE: 0.77

## 2021-03-04 NOTE — PATIENT INSTRUCTIONS
For prevention:  Propranolol CR 60mg every day, increase water intake, change positions slowly.         Sent  Rx for 100mg tablet Take 1/2 tablet by mouth  at onset of headache, may repeat dose in 2 hours if unrelieved.  Do not exceed more than 2 tablets in 24 hours. No more than 9 tablets per month.       Recommend the following over the counter supplements every night at bedtime:  Start magnesium oxide 400mg gel cap,  by mouth every night, may take extra dose if needed for headache (over the counter), hold for diarrhea         Start Riboflavin (Vitamin B2) 400mg by mouth every night (over the counter),may turn urine bright yellow         Start COQ 10, take 300mg every night. (over the counter)          Attempt to go to bed and get up at the same time every night           Eat meals on regular basis            Stay hydrated.             Aerobic exercise 30 minutes daily             Avoid aged or smoked foods, avoid processed foods, red wine, aged cheese              Keep headache diary, include foods that you may have eaten.             Avoid overusing over the counter medications:  Do not take more than 500mg acetaminophen (tylenol), more than 4 times weekly, more frequent or larger doses are associated with medication overuse headache.                Prediabetes Eating Plan  Prediabetes is a condition that causes blood sugar (glucose) levels to be higher than normal. This increases the risk for developing diabetes. In order to prevent diabetes from developing, your health care provider may recommend a diet and other lifestyle changes to help you:  · Control your blood glucose levels.  · Improve your cholesterol levels.  · Manage your blood pressure.  Your health care provider may recommend working with a diet and nutrition specialist (dietitian) to make a meal plan that is best for you.  What are tips for following this plan?  Lifestyle  · Set weight loss goals with the help of your health care team. It is  recommended that most people with prediabetes lose 7% of their current body weight.  · Exercise for at least 30 minutes at least 5 days a week.  · Attend a support group or seek ongoing support from a mental health counselor.  · Take over-the-counter and prescription medicines only as told by your health care provider.  Reading food labels  · Read food labels to check the amount of fat, salt (sodium), and sugar in prepackaged foods. Avoid foods that have:  ? Saturated fats.  ? Trans fats.  ? Added sugars.  · Avoid foods that have more than 300 milligrams (mg) of sodium per serving. Limit your daily sodium intake to less than 2,300 mg each day.  Shopping  · Avoid buying pre-made and processed foods.  Cooking  · Cook with olive oil. Do not use butter, lard, or ghee.  · Bake, broil, grill, or boil foods. Avoid frying.  Meal planning    · Work with your dietitian to develop an eating plan that is right for you. This may include:  ? Tracking how many calories you take in. Use a food diary, notebook, or mobile application to track what you eat at each meal.  ? Using the glycemic index (GI) to plan your meals. The index tells you how quickly a food will raise your blood glucose. Choose low-GI foods. These foods take a longer time to raise blood glucose.  · Consider following a Mediterranean diet. This diet includes:  ? Several servings each day of fresh fruits and vegetables.  ? Eating fish at least twice a week.  ? Several servings each day of whole grains, beans, nuts, and seeds.  ? Using olive oil instead of other fats.  ? Moderate alcohol consumption.  ? Eating small amounts of red meat and whole-fat dairy.  · If you have high blood pressure, you may need to limit your sodium intake or follow a diet such as the DASH eating plan. DASH is an eating plan that aims to lower high blood pressure.  What foods are recommended?  The items listed below may not be a complete list. Talk with your dietitian about what dietary  choices are best for you.  Grains  Whole grains, such as whole-wheat or whole-grain breads, crackers, cereals, and pasta. Unsweetened oatmeal. Bulg[place]. Barley. Quinoa. Brown rice. Corn or whole-wheat flour tortillas or taco shells.  Vegetables  Lettuce. Spinach. Peas. Beets. Cauliflower. Cabbage. Broccoli. Carrots. Tomatoes. Squash. Eggplant. Herbs. Peppers. Onions. Cucumbers. [place] sprouts.  Fruits  Berries. Bananas. Apples. Oranges. Grapes. Papaya. [place]. Pomegranate. Kiwi. Grapefruit. Cherries.  Meats and other protein foods  Seafood. Poultry without skin. Lean cuts of pork and beef. Tofu. Eggs. Nuts. Beans.  Dairy  Low-fat or fat-free dairy products, such as yogurt, cottage cheese, and cheese.  Beverages  Water. Tea. Coffee. Sugar-free or diet soda. [place] water. Lowfat or no-fat milk. Milk alternatives, such as soy or almond milk.  Fats and oils  Olive oil. Canola oil. Sunflower oil. Grapeseed oil. Avocado. Walnuts.  Sweets and desserts  Sugar-free or low-fat pudding. Sugar-free or low-fat ice cream and other frozen treats.  Seasoning and other foods  Herbs. Sodium-free spices. Mustard. Relish. Low-fat, low-sugar ketchup. Low-fat, low-sugar barbecue sauce. Low-fat or fat-free mayonnaise.  What foods are not recommended?  The items listed below may not be a complete list. Talk with your dietitian about what dietary choices are best for you.  Grains  Refined white flour and flour products, such as bread, pasta, snack foods, and cereals.  Vegetables  Canned vegetables. Frozen vegetables with butter or cream sauce.  Fruits  Fruits canned with syrup.  Meats and other protein foods  Fatty cuts of meat. Poultry with skin. Breaded or fried meat. Processed meats.  Dairy  Full-fat yogurt, cheese, or milk.  Beverages  Sweetened drinks, such as sweet iced tea and soda.  Fats and oils  Butter. Lard. Ghee.  Sweets and desserts  Baked goods, such as cake, cupcakes, pastries, cookies, and cheesecake.  Seasoning and other  foods  Spice mixes with added salt. Ketchup. Barbecue sauce. Mayonnaise.  Summary  · To prevent diabetes from developing, you may need to make diet and other lifestyle changes to help control blood sugar, improve cholesterol levels, and manage your blood pressure.  · Set weight loss goals with the help of your health care team. It is recommended that most people with prediabetes lose 7 percent of their current body weight.  · Consider following a Mediterranean diet that includes plenty of fresh fruits and vegetables, whole grains, beans, nuts, seeds, fish, lean meat, low-fat dairy, and healthy oils.  This information is not intended to replace advice given to you by your health care provider. Make sure you discuss any questions you have with your health care provider.  Document Released: 05/03/2016 Document Revised: 04/10/2020 Document Reviewed: 02/21/2018  Elsevier Patient Education © 2020 [employer]  Migraine Headache  A migraine headache is a very strong throbbing pain on one side or both sides of your head. This type of headache can also cause other symptoms. It can last from 4 hours to 3 days. Talk with your doctor about what things may bring on (trigger) this condition.  What are the causes?  The exact cause of this condition is not known. This condition may be triggered or caused by:  · Drinking alcohol.  · Smoking.  · Taking medicines, such as:  ? Medicine used to treat chest pain (nitroglycerin).  ? Birth control pills.  ? Estrogen.  ? Some blood pressure medicines.  · Eating or drinking certain products.  · Doing physical activity.  Other things that may trigger a migraine headache include:  · Having a menstrual period.  · Pregnancy.  · Hunger.  · Stress.  · Not getting enough sleep or getting too much sleep.  · Weather changes.  · Tiredness (fatigue).  What increases the risk?  · Being 25-55 years old.  · Being female.  · Having a family history of migraine headaches.  · Being .  · Having  depression or anxiety.  · Being very overweight.  What are the signs or symptoms?  · A throbbing pain. This pain may:  ? Happen in any area of the head, such as on one side or both sides.  ? Make it hard to do daily activities.  ? Get worse with physical activity.  ? Get worse around bright lights or loud noises.  · Other symptoms may include:  ? Feeling sick to your stomach (nauseous).  ? Vomiting.  ? Dizziness.  ? Being sensitive to bright lights, loud noises, or smells.  · Before you get a migraine headache, you may get warning signs (an aura). An aura may include:  ? Seeing flashing lights or having blind spots.  ? Seeing bright spots, halos, or zigzag lines.  ? Having tunnel vision or blurred vision.  ? Having numbness or a tingling feeling.  ? Having trouble talking.  ? Having weak muscles.  · Some people have symptoms after a migraine headache (postdromal phase), such as:  ? Tiredness.  ? Trouble thinking (concentrating).  How is this treated?  · Taking medicines that:  ? Relieve pain.  ? Relieve the feeling of being sick to your stomach.  ? Prevent migraine headaches.  · Treatment may also include:  ? Having acupuncture.  ? Avoiding foods that bring on migraine headaches.  ? Learning ways to control your body functions (biofeedback).  ? Therapy to help you know and deal with negative thoughts (cognitive behavioral therapy).  Follow these instructions at home:  Medicines  · Take over-the-counter and prescription medicines only as told by your doctor.  · Ask your doctor if the medicine prescribed to you:  ? Requires you to avoid driving or using heavy machinery.  ? Can cause trouble pooping (constipation). You may need to take these steps to prevent or treat trouble pooping:  § Drink enough fluid to keep your pee (urine) pale yellow.  § Take over-the-counter or prescription medicines.  § Eat foods that are high in fiber. These include beans, whole grains, and fresh fruits and vegetables.  § Limit foods that  are high in fat and sugar. These include fried or sweet foods.  Lifestyle  · Do not drink alcohol.  · Do not use any products that contain nicotine or tobacco, such as cigarettes, e-cigarettes, and chewing tobacco. If you need help quitting, ask your doctor.  · Get at least 8 hours of sleep every night.  · Limit and deal with stress.  General instructions         · Keep a journal to find out what may bring on your migraine headaches. For example, write down:  ? What you eat and drink.  ? How much sleep you get.  ? Any change in what you eat or drink.  ? Any change in your medicines.  · If you have a migraine headache:  ? Avoid things that make your symptoms worse, such as bright lights.  ? It may help to lie down in a dark, quiet room.  ? Do not drive or use heavy machinery.  ? Ask your doctor what activities are safe for you.  · Keep all follow-up visits as told by your doctor. This is important.  Contact a doctor if:  · You get a migraine headache that is different or worse than others you have had.  · You have more than 15 headache days in one month.  Get help right away if:  · Your migraine headache gets very bad.  · Your migraine headache lasts longer than 72 hours.  · You have a fever.  · You have a stiff neck.  · You have trouble seeing.  · Your muscles feel weak or like you cannot control them.  · You start to lose your balance a lot.  · You start to have trouble walking.  · You pass out (faint).  · You have a seizure.  Summary  · A migraine headache is a very strong throbbing pain on one side or both sides of your head. These headaches can also cause other symptoms.  · This condition may be treated with medicines and changes to your lifestyle.  · Keep a journal to find out what may bring on your migraine headaches.  · Contact a doctor if you get a migraine headache that is different or worse than others you have had.  · Contact your doctor if you have more than 15 headache days in a month.  This information  is not intended to replace advice given to you by your health care provider. Make sure you discuss any questions you have with your health care provider.  Document Released: 09/26/2009 Document Revised: 04/10/2020 Document Reviewed: 01/30/2020  Elsevier Patient Education © 2020 [employer]

## 2021-03-04 NOTE — PROGRESS NOTES
Subjective:    HPI  Jack Piedra is a 40 y.o. male who presents with complicated migriane.    He presented to [facility] on 2/17/2021 with complaints of persistent headache with intermittent blurry vision for 5 days. His left eye felt cloudy,he could see if he really focused. He was having some peripheral vision deficits per his report.      In October 2020 he slipped and fell in the shower and hit his head, no LOC.     He did not have a history of migraines or headaches prior to February.    He had COVID in January, he also complains of intermittent swelling of left  leg since the early January.     Since the hospital visit, he has had about 4-5 days where he couldn't do anything, the sumtriptan helps sometimes. He has not tried anything else  He is not using over the counter medications.  The headache starts off mild, it is on the left temporal/parietal and becomes more severe as it moves to the back.  The pain is throbbing and it can be as high as 8-9/10, the headache can last up to 24 hours. He reports he has jaw pain during the headache and drooping of the lower part of the face and slurred speech.  He also experiences left sided numbness and unsteadiness with the migraine.  He states that when the head pain starts to get better, the pain goes to his chest.       PMH:  prediabetes, several concussions  Social  Hx:  Chews tobacco daily, 1 ca per week, smokes marijuana nightly, rare alcohol use.   Family Hx:  None     Review of Systems   Constitutional: Positive for chills. Negative for fever.   HENT: Negative for hearing loss.    Eyes: Positive for blurred vision.   Respiratory: Negative for cough.    Cardiovascular: Positive for chest pain.   Gastrointestinal: Positive for nausea. Negative for heartburn and vomiting.   Genitourinary: Negative.    Musculoskeletal: Negative for back pain and neck pain.   Skin: Positive for rash.        Rash on thighs   Neurological: Positive for dizziness,  "tingling, sensory change, focal weakness and headaches.   Endo/Heme/Allergies: Does not bruise/bleed easily.   Psychiatric/Behavioral: Negative for depression. The patient is not nervous/anxious.      History reviewed. No pertinent past medical history.   Current Outpatient Medications on File Prior to Visit   Medication Sig Dispense Refill   • ibuprofen (MOTRIN) 800 MG Tab Take 800 mg by mouth every 8 hours as needed (pain).       No current facility-administered medications on file prior to visit.          Objective:   I personally reviewed imaging below:  MRI brain 2/18/21:  MRI of the brain without contrast within normal limits.    TTE 2/18/2021:   LVEF 60%, bubble negative, mildly dilated LA       Encounter Vitals  Standard Vitals  Vitals  Blood Pressure: 126/78  Temperature: 37.1 °C (98.7 °F)  Temp src: Temporal  Pulse: 83  Respiration: 14  Pulse Oximetry: 97 %  Height: 182.9 cm (6')  Weight: (!) 132 kg (290 lb 2 oz)  Encounter Vitals  Temperature: 37.1 °C (98.7 °F)  Temp src: Temporal  Blood Pressure: 126/78  Pulse: 83  Respiration: 14  Pulse Oximetry: 97 %  Weight: (!) 132 kg (290 lb 2 oz)  Height: 182.9 cm (6')  BMI (Calculated): 39.35        Physical Exam    Constitutional:  Alert, no apparent distress,  Psych:   mood and affect WNL  Neuro:  Oriented X 4, speech fluent, naming and memory intact  Muskuloskeletal:  Moves all extremities equally, strength 5/5 bilaterally, no drift  CN II: Visual fields are full to confrontation. Fundoscopic exam is normal with sharp discs and no vascular changes. Pupils are 3mm and briskly reactive to light.   CN III, IV, VI  EOMs intact, no ptosis  CN V: Facial sensation is intact to pinprick in all 3 divisions bilaterally.  He reports decreased sensation and \"numbness\" of left side of face, in V1 territory, the decresaed sensation on the left starts about 1 1/2 inches left of the midline. Corneal responses are intact.  CN VII: Face is symmetric with normal eye closure and " smile.  CN VII: Hearing is normal to rubbing fingers  CN IX, X: Palate elevates symmetrically. Phonation is normal.  CN XI: Head turning and shoulder shrug are intact  CN XII: Tongue is midline with normal movements and no atrophy.                           Sensation to PP equal bilaterally                 No limb ataxia with finger to nose and heel to shin                 Ambulates with steady gait.                 Rhomberg negative                Biceps,brachioradialis, tricep, patellar and ankle reflex all 1+     Cardiovascular:    S1S2, no abnormal rhythm auscultated, no peripheral edema  Neck:                     No carotid bruits noted   Pulmonary:            Respirations easy, lungs clear to auscultation all fields.     Skin:                     No obvious rashes.           Assessment/Plan:   1. Complicated migraine    For prevention:  Propranolol CR 60mg every day, increase water intake, change positions slowly.     Continue sumtriptan,   Send Rx for 100mg tablet Take 1/2 tablet by mouth  at onset of headache, may repeat dose in 2 hours if unrelieved.  Do not exceed more than 2 tablets in 24 hours. No more than 9 tablets per month.      Start magnesium oxide 400mg by mouth every night, may take extra dose if needed for headache (over the counter), hold for diarrhea         Start Riboflavin (Vitamin B2) 400mg by mouth every night (over the counter),may turn urine bright yellow         Start COQ 10, take 300mg every night. (over the counter)          Attempt to go to bed and get up at the same time every night           Eat meals on regular basis            Stay hydrated.             Aerobic exercise 30 minutes daily             Avoid aged or smoked foods, avoid processed foods, red wine, aged cheese              Keep headache diary, include foods that you may have eaten.             Avoid overusing over the counter medications:  Do not take more than 500mg acetaminophen (tylenol), more than 4 times weekly,  more frequent or larger doses are associated with medication overuse headache.      2. Prediabetes  A1C 6.2, discussed dietary modifications.      Start magnesium oxide 400mg by mouth every night, may take extra dose if needed for headache (over the counter), hold for diarrhea         Start Riboflavin (Vitamin B2) 400mg by mouth every night (over the counter),may turn urine bright yellow         Start COQ 10, take 300mg every night. (over the counter)          Attempt to go to bed and get up at the same time every night           Eat meals on regular basis            Stay hydrated.             Aerobic exercise 30 minutes daily             Avoid aged or smoked foods, avoid processed foods, red wine, aged cheese              Keep headache diary, include foods that you may have eaten.             Avoid overusing over the counter medications:  Do not take more than 500mg acetaminophen (tylenol), more than 4 times weekly, more frequent or larger doses are associated with medication overuse headache.      I spent 44 minutes caring for patient, my time includes reviewing the chart, obtaining current HPI, exam, discussion of disease process, ordering testing and medications and counseling.      I counseled patient on migraine triggers, lifestyle changes, medication overuse, supplements and medication side effects.      Follow up in 4 months.

## 2021-03-09 ENCOUNTER — OFFICE VISIT (OUTPATIENT)
Dept: MEDICAL GROUP | Facility: PHYSICIAN GROUP | Age: 41
End: 2021-03-09

## 2021-03-09 VITALS
BODY MASS INDEX: 39.28 KG/M2 | RESPIRATION RATE: 16 BRPM | TEMPERATURE: 97 F | DIASTOLIC BLOOD PRESSURE: 86 MMHG | SYSTOLIC BLOOD PRESSURE: 144 MMHG | OXYGEN SATURATION: 96 % | WEIGHT: 290 LBS | HEIGHT: 72 IN | HEART RATE: 99 BPM

## 2021-03-09 DIAGNOSIS — Z09 HOSPITAL DISCHARGE FOLLOW-UP: Primary | ICD-10-CM

## 2021-03-09 DIAGNOSIS — R74.8 ELEVATED LIVER ENZYMES: ICD-10-CM

## 2021-03-09 DIAGNOSIS — G43.109 COMPLICATED MIGRAINE: ICD-10-CM

## 2021-03-09 DIAGNOSIS — R73.03 PREDIABETES: ICD-10-CM

## 2021-03-09 DIAGNOSIS — M79.89 LEFT LEG SWELLING: ICD-10-CM

## 2021-03-09 PROCEDURE — 99215 OFFICE O/P EST HI 40 MIN: CPT | Performed by: NURSE PRACTITIONER

## 2021-03-09 ASSESSMENT — FIBROSIS 4 INDEX: FIB4 SCORE: 0.77

## 2021-03-09 NOTE — ASSESSMENT & PLAN NOTE
Patient has had headaches which are complicated with blurry vision and neurologic changes.  These symptoms brought him to the ER on February 17.  He did have a negative CT of his head and negative MRI of his brain at that time.  He was discharged and referred to neurology.  He did see the neurologist on March 4, who placed him on propranolol and Imitrex.  Patient at this time is unsure whether he actually has migraines, or something else.  Discussed that he has symptoms consistent with complicated migraines, and that it is reassuring that his testing in the emergency room does rule out stroke, blood clot, or cardiac cause.  Patient does not want to take much medication if possible.  We discussed dietary supplements, dietary changes, and exercise.  We discussed that he will keep a headache diary, and we will do a telehealth visit in 3 weeks to discuss his headache pattern, symptoms, what helps and what makes things worse. Patient in agreement with this plan and verbalizes understanding.

## 2021-03-09 NOTE — PROGRESS NOTES
Subjective:     CC: Hospital follow up, establish care.     HPI:   Jack presents today to establish care with me, and to discuss his recent hospital admission on February 17.  Patient's past medical history is relatively young remarkable, except for left knee surgery with multiple repairs including the MCL, ACL, and LCL.  Patient reports that he and his wife had Covid towards the end of January.  A couple of weeks after that, his left knee became swollen upon awakening, and was painful to the touch.  He took some ibuprofen and the swelling went down by the next day.  A few days after that, he experienced a left-sided headache, that radiated to the right side, accompanied with visual changes.  He worried that he may have a blood clot due to his post Covid status, and thought he may be having a stroke.  He presented to [facility] urgent care, who sent him to the emergency department.  He was admitted for further work-up on February 17, 2021.  He did have a negative CT of the head, negative MRI of the brain, negative troponins, and a negative echocardiogram.  It was then felt that the patient may be having complicated migraine and he was discharged home with Imitrex and Fioricet, with follow-up set up with neurology and primary care.  Additionally, while the patient was in the hospital he was found to have elevated liver enzymes as well as an elevated A1c of 6.2.  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  Since patient has been discharged from the hospital, he has experienced a few more episodes of these potential migraines.  He has taken Imitrex but it makes him very loopy.  He is unsure if he actually has the Fioricet or not.  He does not want to take the propranolol as he does not want to take daily medication if he can avoid it.  He has followed up with neurology, who does feel that patient's episodes are consistent  with migraines.  Additionally, he tells me that his knee has had a second episode of this swelling that goes all the way down into his calf, but does resolve with use of 800 mg of ibuprofen by the next day.    History reviewed. No pertinent past medical history.    Social History     Tobacco Use   • Smoking status: Former Smoker     Packs/day: 0.50     Types: Cigarettes   • Smokeless tobacco: Current User     Types: Chew   Substance Use Topics   • Alcohol use: Not Currently   • Drug use: Yes     Types: Marijuana       Current Outpatient Medications Ordered in Epic   Medication Sig Dispense Refill   • ibuprofen (MOTRIN) 800 MG Tab Take 800 mg by mouth every 8 hours as needed (pain).     • sumatriptan (IMITREX) 100 MG tablet Take 1/2  tablet po at onset of headache, may repeat dose in 2 hours if unrelieved.  Do not exceed more than 2 tablets in 24 hours. (Patient not taking: Reported on 3/9/2021) 9 tablet 11   • propranolol LA (INDERAL LA) 60 MG CAPSULE SR 24 HR Take 1 capsule by mouth every day. (Patient not taking: Reported on 3/9/2021) 90 capsule 3     No current [facility]-ordered facility-administered medications on file.       Allergies:  Augmentin    Health Maintenance: Completed    ROS:  Gen: no fevers/chills, no changes in weight  Eyes: no changes in vision, +visual disturbance with migraine-like episodes  ENT: no sore throat, no hearing loss, no bloody nose  Pulm: no sob, no cough  CV: no chest pain, no palpitations  GI: no nausea/vomiting, no diarrhea  : no dysuria  MSk: no myalgias  Skin: no rash  Neuro: + headaches, no numbness/tingling  Heme/Lymph: no easy bruising      Objective:       Exam:  /86 (BP Location: Left arm, Patient Position: Sitting, BP Cuff Size: Large adult)   Pulse 99   Temp 36.1 °C (97 °F) (Temporal)   Resp 16   Ht 1.829 m (6')   Wt (!) 132 kg (290 lb)   SpO2 96%   BMI 39.33 kg/m²  Body mass index is 39.33 kg/m².    Gen: Alert and oriented, No apparent distress.  Neck: Neck is  supple without lymphadenopathy. No carotid bruits.   Lungs: Normal effort, CTA bilaterally, no wheezes, rhonchi, or rales  CV: Regular rate and rhythm. No murmurs, rubs, or gallops.  Ext: No clubbing, cyanosis, edema.      Labs: Reviewed labs from 2/17/21 hospitalization, significant for HGA1c 6.2, AST 55 and .     Assessment & Plan:     40 y.o. male with the following -     Hospital discharge follow-up  Patient was seen at [facility] ER on February 17, 2021.  Patient presented due to symptoms of chest pain, shortness of breath, and neurologic changes.  Records from admission were reviewed with patient.  Both cardiac cause and neurologic cause work-up were negative.  Patient was diagnosed with complicated migraines and discharged to follow-up with neurology and primary care.  Patient has seen neurology on March 4, and established with me today March 9.  Reassured patient that during his hospital stay, appropriate testing was completed to rule out cardiac and stroke.    Prediabetes  During recent hospitalization, patient was found to have an elevated A1c of 6.2.  Patient was educated that this means he is prediabetic.  Education was provided today about diet and exercise to help bring this number down.  Patient is motivated to do this and will repeat A1c in 3 months.    Left leg swelling  Patient reports that 2 weeks prior to his admission to [facility] in February, he had an episode where he woke up 1 morning and his left knee was swollen from his knee all the way down to his calf.  As patient had just recovered from Covid, he was concerned about this being a blood clot.  He did have an ultrasound at [facility] on February 17 showing no evidence of a DVT.  Since that time, his knee has swollen up like that once and immediately resolved by the next day with use of ibuprofen.  As his knee is not symptomatic today, discussed with patient that the next time his knee swells like this I would like to evaluate him and order  x-rays of his knee.  He does have a history of extensive knee surgery due to football injuries, as well as a Baker's cyst.  Patient will let me know if his knee becomes symptomatic, and further work-up will be determined at that time.    Complicated migraine  Patient has had headaches which are complicated with blurry vision and neurologic changes.  These symptoms brought him to the ER on February 17.  He did have a negative CT of his head and negative MRI of his brain at that time.  He was discharged and referred to neurology.  He did see the neurologist on March 4, who placed him on propranolol and Imitrex.  Patient at this time is unsure whether he actually has migraines, or something else.  Discussed that he has symptoms consistent with complicated migraines, and that it is reassuring that his testing in the emergency room does rule out stroke, blood clot, or cardiac cause.  Patient does not want to take much medication if possible.  We discussed dietary supplements, dietary changes, and exercise.  We discussed that he will keep a headache diary, and we will do a telehealth visit in 3 weeks to discuss his headache pattern, symptoms, what helps and what makes things worse. Patient in agreement with this plan and verbalizes understanding.     Elevated liver enzymes  During patient's recent hospitalization, he was noted to have elevated liver enzymes.  Patient states that he does not consume much alcohol.  He will cut down on the little amount of alcohol he is drinking at this time.  He will also avoid Tylenol products.  We will recheck these labs when recheck his A1c in 3 months.    My total time spent caring for the patient on the day of the encounter was 65 minutes.   This does not include time spent on separately billable procedures/tests.    Patient will follow up 3/30/21 with a virtual visit.     Please note that this dictation was created using voice recognition software. I have made every reasonable attempt  to correct obvious errors, but I expect that there are errors of grammar and possibly content that I did not discover before finalizing the note.

## 2021-03-09 NOTE — ASSESSMENT & PLAN NOTE
Patient was seen at [facility] on February 17, 2021.  Patient presented due to symptoms of chest pain, shortness of breath, and neurologic changes.  Records from admission were reviewed with patient.  Both cardiac cause and neurologic cause work-up were negative.  Patient was diagnosed with complicated migraines and discharged to follow-up with neurology and primary care.  Patient has seen neurology on March 4, and established with me today March 9.

## 2021-03-09 NOTE — ASSESSMENT & PLAN NOTE
During recent hospitalization, patient was found to have an elevated A1c of 6.2.  Patient was educated that this means he is prediabetic.  Education was provided today about diet and exercise to help bring this number down.  Patient is motivated to do this and will repeat A1c in 3 months.

## 2021-03-09 NOTE — ASSESSMENT & PLAN NOTE
During patient's recent hospitalization, he was noted to have elevated liver enzymes.  Patient states that he does not consume much alcohol.  He will cut down on the little amount of alcohol he is drinking at this time.  He will also avoid Tylenol products.  We will recheck these labs when recheck his A1c in 3 months.

## 2021-03-09 NOTE — ASSESSMENT & PLAN NOTE
Patient reports that 2 weeks prior to his admission to [facility] in February, he had an episode where he woke up 1 morning and his left knee was swollen from his knee all the way down to his calf.  As patient had just recovered from Covid, he was concerned about this being a blood clot.  He did have an ultrasound at [facility] on February 17 showing no evidence of a DVT.  Since that time, his knee has swollen up like that once and immediately resolved by the next day with use of ibuprofen.  As his knee is not symptomatic today, discussed with patient that the next time his knee swells like this I would like to evaluate him and order x-rays of his knee.  He does have a history of extensive knee surgery due to football injuries, as well as a Baker's cyst.  Patient will let me know if his knee becomes symptomatic, and further work-up will be determined at that time.

## 2021-03-18 DIAGNOSIS — R51.9 ACUTE NONINTRACTABLE HEADACHE, UNSPECIFIED HEADACHE TYPE: ICD-10-CM

## 2021-03-18 DIAGNOSIS — M79.89 LEFT LEG SWELLING: ICD-10-CM

## 2021-03-18 NOTE — PROGRESS NOTES
Patient called with repeated painful left knee swelling all the way to his ankle.  He also has persistent headache and jaw numbness.  New orders placed for left knee MRI - suspect internal derangement not bony abnormality.  New orders placed for sed rate and CRP to R/O temporal arteritis.  Will call patient with results of blood tests as soon as they are back.

## 2021-03-19 ENCOUNTER — HOSPITAL ENCOUNTER (OUTPATIENT)
Dept: RADIOLOGY | Facility: MEDICAL CENTER | Age: 41
End: 2021-03-19
Attending: NURSE PRACTITIONER

## 2021-03-19 ENCOUNTER — HOSPITAL ENCOUNTER (OUTPATIENT)
Dept: LAB | Facility: MEDICAL CENTER | Age: 41
End: 2021-03-19
Attending: NURSE PRACTITIONER

## 2021-03-19 DIAGNOSIS — R74.8 ELEVATED LIVER ENZYMES: ICD-10-CM

## 2021-03-19 DIAGNOSIS — M79.89 LEFT LEG SWELLING: ICD-10-CM

## 2021-03-19 DIAGNOSIS — R73.03 PREDIABETES: ICD-10-CM

## 2021-03-19 DIAGNOSIS — R51.9 ACUTE NONINTRACTABLE HEADACHE, UNSPECIFIED HEADACHE TYPE: ICD-10-CM

## 2021-03-19 LAB
ALBUMIN SERPL BCP-MCNC: 4.3 G/DL (ref 3.2–4.9)
ALBUMIN/GLOB SERPL: 1.6 G/DL
ALP SERPL-CCNC: 85 U/L (ref 30–99)
ALT SERPL-CCNC: 84 U/L (ref 2–50)
ANION GAP SERPL CALC-SCNC: 10 MMOL/L (ref 7–16)
AST SERPL-CCNC: 37 U/L (ref 12–45)
BILIRUB SERPL-MCNC: 0.2 MG/DL (ref 0.1–1.5)
BUN SERPL-MCNC: 17 MG/DL (ref 8–22)
CALCIUM SERPL-MCNC: 9.3 MG/DL (ref 8.4–10.2)
CHLORIDE SERPL-SCNC: 107 MMOL/L (ref 96–112)
CO2 SERPL-SCNC: 22 MMOL/L (ref 20–33)
CREAT SERPL-MCNC: 1.04 MG/DL (ref 0.5–1.4)
CRP SERPL HS-MCNC: 0.34 MG/DL (ref 0–0.75)
ERYTHROCYTE [SEDIMENTATION RATE] IN BLOOD BY WESTERGREN METHOD: 1 MM/HOUR (ref 0–15)
GLOBULIN SER CALC-MCNC: 2.7 G/DL (ref 1.9–3.5)
GLUCOSE SERPL-MCNC: 154 MG/DL (ref 65–99)
POTASSIUM SERPL-SCNC: 4.3 MMOL/L (ref 3.6–5.5)
PROT SERPL-MCNC: 7 G/DL (ref 6–8.2)
SODIUM SERPL-SCNC: 139 MMOL/L (ref 135–145)

## 2021-03-19 PROCEDURE — 73721 MRI JNT OF LWR EXTRE W/O DYE: CPT | Mod: LT

## 2021-03-19 PROCEDURE — 80053 COMPREHEN METABOLIC PANEL: CPT

## 2021-03-19 PROCEDURE — 86140 C-REACTIVE PROTEIN: CPT

## 2021-03-19 PROCEDURE — 83036 HEMOGLOBIN GLYCOSYLATED A1C: CPT

## 2021-03-19 PROCEDURE — 36415 COLL VENOUS BLD VENIPUNCTURE: CPT

## 2021-03-19 PROCEDURE — 85652 RBC SED RATE AUTOMATED: CPT

## 2021-03-20 LAB
EST. AVERAGE GLUCOSE BLD GHB EST-MCNC: 128 MG/DL
HBA1C MFR BLD: 6.1 % (ref 4–5.6)

## 2021-03-22 ENCOUNTER — PATIENT MESSAGE (OUTPATIENT)
Dept: MEDICAL GROUP | Facility: PHYSICIAN GROUP | Age: 41
End: 2021-03-22

## 2021-03-22 NOTE — TELEPHONE ENCOUNTER
----- Message from AYSHA Guzman sent at 3/22/2021  7:57 AM PDT -----  Hi,  Can you call Jack and let him know that I would like to send him to an orthopedic surgeon for his knee.  He has some loose bodies and ligamentous changes (possible tears) that need to be seen by a specialist.  Also, can you ask about his headaches?  Rachel

## 2021-03-25 DIAGNOSIS — M79.89 LEFT LEG SWELLING: ICD-10-CM

## 2021-03-30 ENCOUNTER — TELEMEDICINE (OUTPATIENT)
Dept: MEDICAL GROUP | Facility: PHYSICIAN GROUP | Age: 41
End: 2021-03-30

## 2021-03-30 VITALS — BODY MASS INDEX: 39.28 KG/M2 | TEMPERATURE: 96.1 F | RESPIRATION RATE: 16 BRPM | WEIGHT: 290 LBS | HEIGHT: 72 IN

## 2021-03-30 DIAGNOSIS — G43.109 COMPLICATED MIGRAINE: Primary | ICD-10-CM

## 2021-03-30 DIAGNOSIS — M79.89 LEFT LEG SWELLING: ICD-10-CM

## 2021-03-30 PROCEDURE — 99213 OFFICE O/P EST LOW 20 MIN: CPT | Mod: 95,CR | Performed by: NURSE PRACTITIONER

## 2021-03-30 RX ORDER — PROPRANOLOL HCL 60 MG
60 CAPSULE, EXTENDED RELEASE 24HR ORAL DAILY
Qty: 90 CAPSULE | Refills: 3 | Status: SHIPPED | OUTPATIENT
Start: 2021-03-30 | End: 2022-03-21 | Stop reason: SDUPTHER

## 2021-03-30 RX ORDER — UBROGEPANT 50 MG/1
50 TABLET ORAL
Qty: 10 TABLET | Refills: 0 | Status: SHIPPED | OUTPATIENT
Start: 2021-03-30 | End: 2021-08-25

## 2021-03-30 ASSESSMENT — FIBROSIS 4 INDEX: FIB4 SCORE: 0.6

## 2021-03-31 NOTE — PROGRESS NOTES
Virtual Visit: Established Patient   This visit was conducted via Zoom using secure and encrypted videoconferencing technology. The patient was in a private location in the state of [place].    The patient's identity was confirmed and verbal consent was obtained for this virtual visit.    Subjective:   CC:   Chief Complaint   Patient presents with   • Follow-Up   • Headache     worsening headaches and dizziness (10-20/day) up to 10 minutes   • Other     sharp pain in the left leg       Jack Piedra is a 40 y.o. male presenting for evaluation and management of:    Complicated migraine  Chronic condition, not well controlled.  Patient reports that his headaches are more frequent, coming in clusters. He does report that if he tries to relax then they will go away usually.  He is not taking anything daily and he cannot take Imitrex for relief as it makes him loopy.  Patient is frustrated and worries that this issue may not be related to migraines at all.  Discussed today that with the increasing nature of his headaches, he should take the propranolol daily and see how he does.  Discussed trial of Ubrelvy for abortive headache therapy.  Patient will follow up in two weeks after trying propranolol and Ubrelvy. If he is not improved, will refer back to neurology.  Patient is in agreement with this plan.    Left leg swelling  Chronic condition, stable.  Recent MRI after his last episode of swelling revealed significant pathology including loose bodies in his knee.  Placed referral to ortho for patient, but he feels that he is not symptomatic enough to warrant treatment at this time.  Reassured patient that knee pathology will not cause blood clots.        ROS   Denies any recent fevers or chills. No nausea or vomiting. No chest pains or shortness of breath.  +increasing headaches    Allergies   Allergen Reactions   • Augmentin Unspecified     unknown       Current medicines (including changes today)  Current Outpatient  Medications   Medication Sig Dispense Refill   • MAGNESIUM CITRATE PO Take  by mouth.     • propranolol LA (INDERAL LA) 60 MG CAPSULE SR 24 HR Take 1 capsule by mouth every day. 90 capsule 3   • Ubrogepant (UBRELVY) 50 MG Tab Take 50 mg by mouth 1 time a day as needed. 10 tablet 0   • ibuprofen (MOTRIN) 800 MG Tab Take 800 mg by mouth every 8 hours as needed (pain).       No current facility-administered medications for this visit.       Patient Active Problem List    Diagnosis Date Noted   • CHEST PAIN 07/22/2013     Priority: High   • POLO (dyspnea on exertion) 07/22/2013     Priority: High   • Hospital discharge follow-up 03/09/2021   • Elevated liver enzymes 03/09/2021   • Prediabetes 02/18/2021   • Complicated migraine 02/17/2021   • Left leg swelling 02/17/2021       Family History   Problem Relation Age of Onset   • Diabetes Mother    • No Known Problems Son    • No Known Problems Daughter        He  has no past medical history on file.  He  has a past surgical history that includes hand surgery (Right).       Objective:   Temp (!) 35.6 °C (96.1 °F) (Tympanic) Comment: taken at home  Resp 16   Ht 1.829 m (6')   Wt (!) 132 kg (290 lb) Comment: when last checked  BMI 39.33 kg/m²   Respirations visually estimated to be 14.     Physical Exam:  Constitutional: Alert, no distress, well-groomed.  Skin: No rashes in visible areas.  ENMT: Lips pink without lesions. Phonation normal.  Neck: No masses, no thyromegaly. Moves freely without pain.  Respiratory: Unlabored respiratory effort, no cough or audible wheeze  Psych: Alert and oriented x3, normal affect and mood.       Assessment and Plan:   The following treatment plan was discussed:     1. Complicated migraine  Chronic condition, not well controlled.  Patient reports that his headaches are more frequent, coming in clusters. He does report that if he tries to relax then they will go away usually.  He is not taking anything daily and he cannot take Imitrex for  relief as it makes him loopy.  Patient is frustrated and worries that this issue may not be related to migraines at all.  Discussed today that with the increasing nature of his headaches, he should take the propranolol daily and see how he does.  Discussed trial of Ubrelvy for abortive headache therapy.  Patient will follow up in two weeks after trying propranolol and Ubrelvy. If he is not improved, will refer back to neurology.  Patient is in agreement with this plan.  - propranolol LA (INDERAL LA) 60 MG CAPSULE SR 24 HR; Take 1 capsule by mouth every day.  Dispense: 90 capsule; Refill: 3  - Ubrogepant (UBRELVY) 50 MG Tab; Take 50 mg by mouth 1 time a day as needed.  Dispense: 10 tablet; Refill: 0    2. Left leg swelling  Chronic condition, stable.  Recent MRI after his last episode of swelling revealed significant pathology including loose bodies in his knee.  Placed referral to ortho for patient, but he feels that he is not symptomatic enough to warrant treatment at this time.  Reassured patient that knee pathology will not cause blood clots.     Follow-up: Patient will follow up 4/14/2021.

## 2021-03-31 NOTE — ASSESSMENT & PLAN NOTE
Chronic condition, not well controlled.  Patient reports that his headaches are more frequent, coming in clusters. He does report that if he tries to relax then they will go away usually.  He is not taking anything daily and he cannot take Imitrex for relief as it makes him loopy.  Patient is frustrated and worries that this issue may not be related to migraines at all.  Discussed today that with the increasing nature of his headaches, he should take the propranolol daily and see how he does.  Discussed trial of Ubrelvy for abortive headache therapy.  Patient will follow up in two weeks after trying propranolol and Ubrelvy. If he is not improved, will refer back to neurology.  Patient is in agreement with this plan.

## 2021-03-31 NOTE — ASSESSMENT & PLAN NOTE
Chronic condition, stable.  Recent MRI after his last episode of swelling revealed significant pathology including loose bodies in his knee.  Placed referral to ortho for patient, but he feels that he is not symptomatic enough to warrant treatment at this time.  Reassured patient that knee pathology will not cause blood clots.

## 2021-05-06 DIAGNOSIS — H66.90 ACUTE OTITIS MEDIA, UNSPECIFIED OTITIS MEDIA TYPE: ICD-10-CM

## 2021-05-06 RX ORDER — AMOXICILLIN AND CLAVULANATE POTASSIUM 875; 125 MG/1; MG/1
1 TABLET, FILM COATED ORAL 2 TIMES DAILY
Qty: 14 TABLET | Refills: 0 | Status: SHIPPED | OUTPATIENT
Start: 2021-05-06 | End: 2021-08-25

## 2021-08-25 ENCOUNTER — OFFICE VISIT (OUTPATIENT)
Dept: MEDICAL GROUP | Facility: PHYSICIAN GROUP | Age: 41
End: 2021-08-25

## 2021-08-25 ENCOUNTER — HOSPITAL ENCOUNTER (OUTPATIENT)
Dept: LAB | Facility: MEDICAL CENTER | Age: 41
End: 2021-08-25
Attending: NURSE PRACTITIONER

## 2021-08-25 VITALS
HEART RATE: 60 BPM | OXYGEN SATURATION: 98 % | WEIGHT: 280 LBS | SYSTOLIC BLOOD PRESSURE: 114 MMHG | RESPIRATION RATE: 14 BRPM | BODY MASS INDEX: 37.93 KG/M2 | DIASTOLIC BLOOD PRESSURE: 80 MMHG | HEIGHT: 72 IN | TEMPERATURE: 98.1 F

## 2021-08-25 DIAGNOSIS — G43.109 COMPLICATED MIGRAINE: ICD-10-CM

## 2021-08-25 DIAGNOSIS — J06.9 UPPER RESPIRATORY VIRUS: Primary | ICD-10-CM

## 2021-08-25 DIAGNOSIS — J06.9 UPPER RESPIRATORY VIRUS: ICD-10-CM

## 2021-08-25 DIAGNOSIS — H83.91 INNER EAR DYSFUNCTION, RIGHT: ICD-10-CM

## 2021-08-25 LAB — SARS-COV-2 AB SERPL QL IA: REACTIVE

## 2021-08-25 PROCEDURE — 99214 OFFICE O/P EST MOD 30 MIN: CPT | Performed by: NURSE PRACTITIONER

## 2021-08-25 PROCEDURE — 36415 COLL VENOUS BLD VENIPUNCTURE: CPT

## 2021-08-25 PROCEDURE — 86769 SARS-COV-2 COVID-19 ANTIBODY: CPT

## 2021-08-25 ASSESSMENT — FIBROSIS 4 INDEX: FIB4 SCORE: 0.62

## 2021-08-25 NOTE — ASSESSMENT & PLAN NOTE
Patient is s/p Covid in January of 2021.  Patient has questions about his immune status as he has had Covid and would like to get antibody testing.  Order placed today.

## 2021-08-25 NOTE — PROGRESS NOTES
Subjective:     CC: Right ear dysfunction, migraine headaches.    HPI:   Jack presents today for follow up of his chronic migraine headaches and recent right ear infections.  He has recently finished his second course of Augmentin in the past couple of months for his right ear symptoms. Patient reports that he is feeling improved after this second course of Augmentin, but he does have quite a bit of pain and pressure sensation in that ear with traveling and elevation changes.  He feels much better when he is in [place], which his higher humidity level than [place].  Patient also has questions about getting the Covid vaccine, specifically the risks and benefits to him as he had a lot of sequelae after vladimir the Covid virus in January of 2021.     No past medical history on file.    Social History     Tobacco Use   • Smoking status: Former Smoker     Packs/day: 0.50     Types: Cigarettes   • Smokeless tobacco: Current User     Types: Chew   Vaping Use   • Vaping Use: Some days   • Devices: Disposable   Substance Use Topics   • Alcohol use: Not Currently   • Drug use: Yes     Types: Marijuana       Current Outpatient Medications Ordered in Epic   Medication Sig Dispense Refill   • Coenzyme Q10 (CO Q 10 PO) Take  by mouth.     • Probiotic Product (CULTURELLE PROBIOTICS PO) Take  by mouth.     • MAGNESIUM CITRATE PO Take  by mouth.     • propranolol LA (INDERAL LA) 60 MG CAPSULE SR 24 HR Take 1 capsule by mouth every day. 90 capsule 3   • ibuprofen (MOTRIN) 800 MG Tab Take 800 mg by mouth every 8 hours as needed (pain).       No current Epic-ordered facility-administered medications on file.       Allergies:  Augmentin    Health Maintenance: Completed    ROS:  Gen: no fevers/chills, no changes in weight  Eyes: no changes in vision  ENT: no sore throat, no hearing loss, no bloody nose  Pulm: no sob, no cough  CV: no chest pain, no palpitations  GI: no nausea/vomiting, no diarrhea  : no dysuria  MSk: no  myalgias  Skin: no rash  Neuro: no headaches, no numbness/tingling  Heme/Lymph: no easy bruising      Objective:       Exam:  /80   Pulse 60   Temp 36.7 °C (98.1 °F)   Resp 14   Ht 1.829 m (6')   Wt (!) 127 kg (280 lb)   SpO2 98%   BMI 37.97 kg/m²  Body mass index is 37.97 kg/m².    Gen: Alert and oriented, No apparent distress.  HEENT:  Right ear hyperemic, otherwise normal.  Left ear with erythematous canal, normal appearing TM.  Neck: Neck is supple without lymphadenopathy.  No carotid bruits.  Lungs: Normal effort, CTA bilaterally, no wheezes, rhonchi, or rales  CV: Regular rate and rhythm. No murmurs, rubs, or gallops.  Ext: No clubbing, cyanosis, edema.    Labs: None.    Assessment & Plan:     41 y.o. male with the following -     1. Upper respiratory virus  Patient is s/p Covid in January of 2021.  Patient has questions about his immune status as he has had Covid and would like to get antibody testing.  Order placed today.   - SARS CoV-2 Ab, Total (non-vaccine); Future    2. Inner ear dysfunction, right  Patient reports that he has had two ear infections in the past few months. He just finished up a course of Augmentin and feels better.  He has also been having some sinus congestion which he feels may affect the pressure in his ears as well.  Discussed the use of Flonase with patient as well as a humidifier at nighttime.  Patient will try this and let me know if this is helpful to him. Samples of Flonase were given to patient today.    3. Complicated migraine  Chronic condition, stable.   Patient reports that he is doing well with his headache management as long as he takes the propranolol LA 60 mg daily. He states that if he misses a dose he has a very bad headache that lasts for several days before it gets back under control.  He was unable to try the Ubrelvy as he does not have insurance and the per pill cost was more than $100.00.  He will continue his current regimen.        Return in about  6 months (around 2/25/2022), or if symptoms worsen or fail to improve.    I have placed the below orders and discussed them with an approved delegating provider.  The MA is performing the below orders under the direction of Shira Mayes MD.    Please note that this dictation was created using voice recognition software. I have made every reasonable attempt to correct obvious errors, but I expect that there are errors of grammar and possibly content that I did not discover before finalizing the note.

## 2021-08-25 NOTE — ASSESSMENT & PLAN NOTE
Chronic condition, stable.   Patient reports that he is doing well with his headache management as long as he takes the propranolol LA 60 mg daily. He states that if he misses a dose he has a very bad headache that lasts for several days before it gets back under control.  He was unable to try the Ubrelvy as he does not have insurance and the per pill cost was more than $100.00.  He will continue his current regimen.

## 2021-08-25 NOTE — ASSESSMENT & PLAN NOTE
Patient reports that he has had two ear infections in the past few months. He just finished up a course of Augmentin and feels better.  He has also been having some sinus congestion which he feels may affect the pressure in his ears as well.  Discussed the use of Flonase with patient as well as a humidifier at nighttime.  Patient will try this and let me know if this is helpful to him. Samples of Flonase were given to patient today.

## 2021-10-11 ENCOUNTER — APPOINTMENT (OUTPATIENT)
Dept: MEDICAL GROUP | Facility: PHYSICIAN GROUP | Age: 41
End: 2021-10-11

## 2021-10-18 ENCOUNTER — TELEMEDICINE (OUTPATIENT)
Dept: MEDICAL GROUP | Facility: PHYSICIAN GROUP | Age: 41
End: 2021-10-18

## 2021-10-18 VITALS — WEIGHT: 270 LBS | BODY MASS INDEX: 36.57 KG/M2 | HEIGHT: 72 IN

## 2021-10-18 DIAGNOSIS — G43.109 COMPLICATED MIGRAINE: Primary | ICD-10-CM

## 2021-10-18 PROCEDURE — 99214 OFFICE O/P EST MOD 30 MIN: CPT | Mod: 95 | Performed by: NURSE PRACTITIONER

## 2021-10-18 RX ORDER — AMITRIPTYLINE HYDROCHLORIDE 10 MG/1
10 TABLET, FILM COATED ORAL NIGHTLY PRN
Qty: 30 TABLET | Refills: 0 | Status: SHIPPED | OUTPATIENT
Start: 2021-10-18 | End: 2021-11-02

## 2021-10-18 RX ORDER — PREDNISONE 10 MG/1
TABLET ORAL
Qty: 30 TABLET | Refills: 0 | Status: SHIPPED | OUTPATIENT
Start: 2021-10-18 | End: 2021-10-26

## 2021-10-18 ASSESSMENT — FIBROSIS 4 INDEX: FIB4 SCORE: 0.62

## 2021-10-19 NOTE — ASSESSMENT & PLAN NOTE
Patient states that he is having worsening control over his migraines.  He states that the left side of his face is numb and he is having shooting nerve pains from his toes into his chest.  He is very concerned that he may have a clot or temporal arteritis. Patient had these exact symptoms after a bout with Covid in February and was admitted to [facility], and ruled out for stroke and cardiac source. He was referred to [facility] headache clinic and was diagnosed with complicated migraines and started on propranolol. Medication management with his migraines has been difficult as patient does not have insurance and some of these medications are quite expensive.  He is taking the propranolol every day which was helping, but he feels it is not as effective for him as it used to be.  Discussed with patient that I shared his concern for possible temporal arteritis back in March.  I did test him for inflammatory markers at that time, and he was negative.  As patient does live in [place], and does not like to use any labs or care providers there, discussed placing lab orders such as sed rate, CRP and d-dimer for him to get at any [facility] lab when he comes to [place].  Also discussed steroid burst to help with any inflammatory process that may be causing his symptoms, as well as beginning amitriptyline at nighttime to see if this will help patients symptoms. Patient will come to [place] to do these labs and will let me know how the new medications are doing in two to four weeks.  Did discuss with patient that if he truly believes he has symptoms of a clot or any stroke pathology, that he needs to be seen urgently at an emergency room.  Patient verbalizes understanding.

## 2021-10-19 NOTE — PROGRESS NOTES
Virtual Visit: Established Patient   This visit was conducted via Zoom using secure and encrypted videoconferencing technology. The patient was in a private location in the state of [place].    The patient's identity was confirmed and verbal consent was obtained for this virtual visit.    Subjective:   CC:   Chief Complaint   Patient presents with   • Headache     1 1/2 months   • Jaw Pain   • Toe Pain   • Leg Pain   • Chest Pain   • Numbness       Jack Piedra is a 41 y.o. male presenting for evaluation and management of:  Complicated migraines, concern for temporal arteritis and/or clots    ROS   Denies any recent fevers or chills. No nausea or vomiting. No chest pains or shortness of breath. +facial numbness, sharp shooting pains in foot radiating to chest    Allergies   Allergen Reactions   • Augmentin Unspecified     unknown       Current medicines (including changes today)  Current Outpatient Medications   Medication Sig Dispense Refill   • amitriptyline (ELAVIL) 10 MG Tab Take 1 Tablet by mouth at bedtime as needed. 30 Tablet 0   • predniSONE (DELTASONE) 10 MG Tab Take 4 Tablets by mouth every day for 2 days, THEN 3 Tablets every day for 2 days, THEN 2 Tablets every day for 2 days, THEN 1 Tablet every day for 2 days. 30 Tablet 0   • Coenzyme Q10 (CO Q 10 PO) Take  by mouth.     • Probiotic Product (CULTURELLE PROBIOTICS PO) Take  by mouth.     • MAGNESIUM CITRATE PO Take  by mouth.     • propranolol LA (INDERAL LA) 60 MG CAPSULE SR 24 HR Take 1 capsule by mouth every day. 90 capsule 3   • ibuprofen (MOTRIN) 800 MG Tab Take 800 mg by mouth every 8 hours as needed (pain).       No current facility-administered medications for this visit.       Patient Active Problem List    Diagnosis Date Noted   • Inner ear dysfunction, right 08/25/2021   • Upper respiratory virus 08/25/2021   • Hospital discharge follow-up 03/09/2021   • Elevated liver enzymes 03/09/2021   • Prediabetes 02/18/2021   • Complicated migraine  02/17/2021   • Left leg swelling 02/17/2021   • CHEST PAIN 07/22/2013   • POLO (dyspnea on exertion) 07/22/2013       Family History   Problem Relation Age of Onset   • Diabetes Mother    • No Known Problems Son    • No Known Problems Daughter        He  has no past medical history on file.  He  has a past surgical history that includes hand surgery (Right).       Objective:   Ht 1.829 m (6') Comment: pt states  Wt 122 kg (270 lb) Comment: pt states  BMI 36.62 kg/m²   Respirations visually estimated to be 14.     Physical Exam:  Constitutional: Alert, no distress, well-groomed.  Skin: No rashes in visible areas.  ENMT: Lips pink without lesions. Phonation normal.  Neck: No masses, no thyromegaly. Moves freely without pain.  Respiratory: Unlabored respiratory effort, no cough or audible wheeze  Psych: Alert and oriented x3, normal affect and mood.       Assessment and Plan:   The following treatment plan was discussed:     1. Complicated migraine  Patient states that he is having worsening control over his migraines.  He states that the left side of his face is numb and he is having shooting nerve pains from his toes into his chest.  He is very concerned that he may have a clot or temporal arteritis. Patient had these exact symptoms after a bout with Covid in February and was admitted to [facility], and ruled out for stroke and cardiac source. He was referred to [facility] headache clinic and was diagnosed with complicated migraines and started on propranolol. Medication management with his migraines has been difficult as patient does not have insurance and some of these medications are quite expensive.  He is taking the propranolol every day which was helping, but he feels it is not as effective for him as it used to be.  Discussed with patient that I shared his concern for possible temporal arteritis back in March.  I did test him for inflammatory markers at that time, and he was negative.  As patient does  live in [place], and does not like to use any labs or care providers there, discussed placing lab orders such as sed rate, CRP and d-dimer for him to get at any [facility] lab when he comes to [place].  Also discussed steroid burst to help with any inflammatory process that may be causing his symptoms, as well as beginning amitriptyline at nighttime to see if this will help patients symptoms. Patient will come to [place] to do these labs and will let me know how the new medications are doing in two to four weeks.  Did discuss with patient that if he truly believes he has symptoms of a clot or any stroke pathology, that he needs to be seen urgently at an emergency room.  Patient verbalizes understanding.     - amitriptyline (ELAVIL) 10 MG Tab; Take 1 Tablet by mouth at bedtime as needed.  Dispense: 30 Tablet; Refill: 0  - predniSONE (DELTASONE) 10 MG Tab; Take 4 Tablets by mouth every day for 2 days, THEN 3 Tablets every day for 2 days, THEN 2 Tablets every day for 2 days, THEN 1 Tablet every day for 2 days.  Dispense: 30 Tablet; Refill: 0  - Sed Rate; Future  - D-DIMER; Future  - CRP QUANTITIVE (NON-CARDIAC); Future        Follow-up: Return in about 4 weeks (around 11/15/2021).

## 2021-11-02 ENCOUNTER — HOSPITAL ENCOUNTER (OUTPATIENT)
Dept: LAB | Facility: MEDICAL CENTER | Age: 41
End: 2021-11-02
Attending: NURSE PRACTITIONER

## 2021-11-02 ENCOUNTER — TELEPHONE (OUTPATIENT)
Dept: MEDICAL GROUP | Facility: PHYSICIAN GROUP | Age: 41
End: 2021-11-02

## 2021-11-02 DIAGNOSIS — R20.0 FACIAL NUMBNESS: ICD-10-CM

## 2021-11-02 DIAGNOSIS — G43.109 COMPLICATED MIGRAINE: ICD-10-CM

## 2021-11-02 LAB
CRP SERPL HS-MCNC: 0.54 MG/DL (ref 0–0.75)
D DIMER PPP IA.FEU-MCNC: <0.27 UG/ML (FEU) (ref 0–0.5)
ERYTHROCYTE [SEDIMENTATION RATE] IN BLOOD BY WESTERGREN METHOD: 5 MM/HOUR (ref 0–20)

## 2021-11-02 PROCEDURE — 85379 FIBRIN DEGRADATION QUANT: CPT

## 2021-11-02 PROCEDURE — 86140 C-REACTIVE PROTEIN: CPT

## 2021-11-02 PROCEDURE — 85652 RBC SED RATE AUTOMATED: CPT

## 2021-11-02 PROCEDURE — 36415 COLL VENOUS BLD VENIPUNCTURE: CPT

## 2021-11-02 NOTE — TELEPHONE ENCOUNTER
VOICEMAIL  1. Caller Name: Jack                        Call Back Number: [phone] (home)       2. Message: Pt called yesterday and stated that he had a reaction to the amitriptyline (ELAVIL) 10 MG Tab. Pt also stated that he tried to call last week to report this and never received a call back. Pt states that when he took the amitriptyline (ELAVIL) 10 MG Tab before bed time, he states that he woke up with headache, his eyes were lagging and he states he was really dizzy. Pt states that he had to call in the next day because of these sx and he fell into a wall and had headaches throughout the day the next day.    When I called him back he stated that he stopped taking the amitriptyline (ELAVIL) 10 MG Tab and called in to work. Pt would like to know if he should start a lower dosage and work his way up to a higher dosage?   Pt also stated something about an MRI that he was supposed to make an appt for for blood clots?   There is no order for an MRI, I checked and there is no order in [facility] for an MRI order. Pt could not tell me what the MRI was ordered for or which part of the body. Pt stated for blood clots.    3. Patient approves office to leave a detailed voicemail/MyChart message: N\A

## 2021-11-02 NOTE — PROGRESS NOTES
1. Complicated migraine  - Referral to Neurology  - MR-BRAIN-WITH & W/O; Future    2. Facial numbness  - Referral to Neurology  - MR-BRAIN-WITH & W/O; Future

## 2021-11-03 NOTE — TELEPHONE ENCOUNTER
Provider sent my chart message to pt re: Med advice for pt and his amitriptyline (ELAVIL) 10 MG Tab regimen.

## 2022-02-17 ENCOUNTER — TELEPHONE (OUTPATIENT)
Dept: MEDICAL GROUP | Facility: PHYSICIAN GROUP | Age: 42
End: 2022-02-17

## 2022-02-17 DIAGNOSIS — U07.1 COVID-19: ICD-10-CM

## 2022-02-17 RX ORDER — DEXAMETHASONE 6 MG/1
6 TABLET ORAL DAILY
Qty: 10 TABLET | Refills: 0 | Status: SHIPPED | OUTPATIENT
Start: 2022-02-17 | End: 2022-03-22

## 2022-02-17 NOTE — TELEPHONE ENCOUNTER
VOICEMAIL  1. Caller Name: Jack                        Call Back Number: [phone] (home)       2. Message: Pt called stating that he has covid again and he is having terrible headaches. Pt would like to know if there is something he could take to help it because he states he is getting really bad sharp pains in his head again.    Please advise.    3. Patient approves office to leave a detailed voicemail/MyChart message: N\A      Pt prefers a return phone call about this matter.

## 2022-03-21 DIAGNOSIS — G43.109 COMPLICATED MIGRAINE: ICD-10-CM

## 2022-03-21 RX ORDER — PROPRANOLOL HCL 60 MG
60 CAPSULE, EXTENDED RELEASE 24HR ORAL DAILY
Qty: 90 CAPSULE | Refills: 0 | Status: SHIPPED | OUTPATIENT
Start: 2022-03-21 | End: 2022-06-25 | Stop reason: SDUPTHER

## 2022-03-22 ENCOUNTER — OFFICE VISIT (OUTPATIENT)
Dept: MEDICAL GROUP | Facility: PHYSICIAN GROUP | Age: 42
End: 2022-03-22

## 2022-03-22 ENCOUNTER — APPOINTMENT (OUTPATIENT)
Dept: LAB | Facility: MEDICAL CENTER | Age: 42
End: 2022-03-22

## 2022-03-22 VITALS
BODY MASS INDEX: 39.94 KG/M2 | RESPIRATION RATE: 16 BRPM | HEART RATE: 92 BPM | DIASTOLIC BLOOD PRESSURE: 84 MMHG | SYSTOLIC BLOOD PRESSURE: 136 MMHG | HEIGHT: 72 IN | OXYGEN SATURATION: 95 % | WEIGHT: 294.9 LBS | TEMPERATURE: 98.1 F

## 2022-03-22 DIAGNOSIS — R79.89 LOW VITAMIN D LEVEL: ICD-10-CM

## 2022-03-22 DIAGNOSIS — R53.82 CHRONIC FATIGUE: Primary | ICD-10-CM

## 2022-03-22 PROCEDURE — 99214 OFFICE O/P EST MOD 30 MIN: CPT | Performed by: NURSE PRACTITIONER

## 2022-03-22 RX ORDER — ERGOCALCIFEROL 1.25 MG/1
50000 CAPSULE ORAL
Qty: 12 CAPSULE | Refills: 1 | Status: SHIPPED | OUTPATIENT
Start: 2022-03-22 | End: 2022-06-25 | Stop reason: SDUPTHER

## 2022-03-22 ASSESSMENT — PATIENT HEALTH QUESTIONNAIRE - PHQ9: CLINICAL INTERPRETATION OF PHQ2 SCORE: 0

## 2022-03-22 ASSESSMENT — FIBROSIS 4 INDEX: FIB4 SCORE: 0.62

## 2022-03-23 NOTE — PROGRESS NOTES
Subjective:     CC: The primary encounter diagnosis was Chronic fatigue. A diagnosis of Low vitamin D level was also pertinent to this visit.      HPI:   Jack is an established patient of mine here for follow-up.  We discussed the following problems:    1. Chronic fatigue  Acute condition.  Patient here for evaluation today.    2. Low vitamin D level  Chronic condition, stable.  Patient due for updated labs today.       No past medical history on file.    Social History     Tobacco Use   • Smoking status: Former Smoker     Packs/day: 0.50     Types: Cigarettes   • Smokeless tobacco: Current User     Types: Chew   Vaping Use   • Vaping Use: Some days   • Substances: Nicotine   • Devices: Disposable   Substance Use Topics   • Alcohol use: Not Currently   • Drug use: Yes     Types: Marijuana, Inhaled       Current Outpatient Medications Ordered in Epic   Medication Sig Dispense Refill   • Cyanocobalamin (B-12 PO) Take  by mouth.     • ergocalciferol (DRISDOL) 59888 UNIT capsule Take 1 Capsule by mouth every 7 days. 12 Capsule 1   • propranolol LA (INDERAL LA) 60 MG CAPSULE SR 24 HR Take 1 Capsule by mouth every day. 90 Capsule 0   • Coenzyme Q10 (CO Q 10 PO) Take  by mouth.     • Probiotic Product (CULTURELLE PROBIOTICS PO) Take  by mouth.     • MAGNESIUM CITRATE PO Take  by mouth.     • ibuprofen (MOTRIN) 800 MG Tab Take 800 mg by mouth every 8 hours as needed (pain).       No current Epic-ordered facility-administered medications on file.       Allergies:  Amitriptyline hcl and Augmentin    Health Maintenance: Completed    ROS:  Gen: no fevers/chills, no changes in weight, +fatigue  Eyes: no changes in vision  ENT: no sore throat, no hearing loss, no bloody nose  Pulm: no sob, no cough  CV: no chest pain, no palpitations  GI: no nausea/vomiting, no diarrhea  : no dysuria  MSk: no myalgias  Skin: no rash  Neuro: no headaches, no numbness/tingling  Heme/Lymph: no easy bruising      Objective:       Exam:  /84  (BP Location: Right arm, Patient Position: Sitting, BP Cuff Size: Adult long)   Pulse 92   Temp 36.7 °C (98.1 °F) (Temporal)   Resp 16   Ht 1.829 m (6')   Wt (!) 134 kg (294 lb 14.4 oz)   SpO2 95%   BMI 40.00 kg/m²  Body mass index is 40 kg/m².    Gen: Alert and oriented, No apparent distress.  Neck: Neck is supple without lymphadenopathy.  No carotid bruits.  Lungs: Normal effort, CTA bilaterally, no wheezes, rhonchi, or rales  CV: Regular rate and rhythm. No murmurs, rubs, or gallops.  Ext: No clubbing, cyanosis, edema.    Labs: Dated: None    Assessment & Plan:     41 y.o. male with the following -     Chronic fatigue  Acute condition.  Patient reports extreme fatigue.  He also has had a myriad of symptoms, resulting in him presenting to the emergency room in [place] yesterday.  He reports that he had bilateral hand swelling, with his veins bulging out.  He had additional pain in his left wrist that shot all the way up and wrapped around to his heart.  He also had his inner thighs become cold, painful, and blue.  While he was in the ER, blood was drawn and he was told he did not have a blood clot or cardiac event.  These labs were requested for me to review today.  Patient states that most of the symptoms are gone at this time.  He also tells me that when he recently had COVID again, he felt much better while he was on Decadron 6 mg daily.  Discussed with patient that it is reassuring that the emergency room did not feel that his symptoms were related to a blood clot or a heart issue, however he has had a constellation of symptoms that is hard to pinpoint what is going on with him.  We have not checked his testosterone level as a source of his fatigue, and we will do this today.  Additionally we have not tested him for autoimmune disease, and I will also do this today.  Lastly, discussed with patient that there is a theory that after Covid a medication regimen consisting of ranitidine, Zyrtec in the  morning and ranitidine, Zyrtec and Benadryl in the evening taking consistently is helping restore energy levels.  Discussed with patient that it is safe to try this, though I do not know whether or not it will be helpful.  Patient is going to try this, as well as get these new labs and follow-up with me in 2 weeks to see how he is doing.      Orders Placed This Encounter   • Testosterone, Free & Total, Adult Male (w/SHBG)   • CAMERON REFLEXIVE PROFILE   • Cyanocobalamin (B-12 PO)   • ergocalciferol (DRISDOL) 72743 UNIT capsule          Return in about 2 weeks (around 4/5/2022).    I have placed the below orders and discussed them with an approved delegating provider.  The MA is performing the below orders under the direction of Shira Mayes MD.    Please note that this dictation was created using voice recognition software. I have made every reasonable attempt to correct obvious errors, but I expect that there are errors of grammar and possibly content that I did not discover before finalizing the note.

## 2022-03-23 NOTE — ASSESSMENT & PLAN NOTE
Acute condition.  Patient reports extreme fatigue.  He also has had a myriad of symptoms, resulting in him presenting to the emergency room in [place] yesterday.  He reports that he had bilateral hand swelling, with his veins bulging out.  He had additional pain in his left wrist that shot all the way up and wrapped around to his heart.  He also had his inner thighs become cold, painful, and blue.  While he was in the ER, blood was drawn and he was told he did not have a blood clot or cardiac event.  These labs were requested for me to review today.  Patient states that most of the symptoms are gone at this time.  He also tells me that when he recently had COVID again, he felt much better while he was on Decadron 6 mg daily.  Discussed with patient that it is reassuring that the emergency room did not feel that his symptoms were related to a blood clot or a heart issue, however he has had a constellation of symptoms that is hard to pinpoint what is going on with him.  We have not checked his testosterone level as a source of his fatigue, and we will do this today.  Additionally we have not tested him for autoimmune disease, and I will also do this today.  Lastly, discussed with patient that there is a theory that after Covid a medication regimen consisting of ranitidine, Zyrtec in the morning and ranitidine, Zyrtec and Benadryl in the evening taking consistently is helping restore energy levels.  Discussed with patient that it is safe to try this, though I do not know whether or not it will be helpful.  Patient is going to try this, as well as get these new labs and follow-up with me in 2 weeks to see how he is doing.

## 2022-04-25 DIAGNOSIS — R79.89 LOW TESTOSTERONE IN MALE: ICD-10-CM

## 2022-06-25 DIAGNOSIS — G43.109 COMPLICATED MIGRAINE: ICD-10-CM

## 2022-06-25 DIAGNOSIS — R79.89 LOW VITAMIN D LEVEL: ICD-10-CM

## 2022-06-25 DIAGNOSIS — R53.82 CHRONIC FATIGUE: ICD-10-CM

## 2022-06-27 RX ORDER — ERGOCALCIFEROL 1.25 MG/1
50000 CAPSULE ORAL
Qty: 12 CAPSULE | Refills: 0 | Status: SHIPPED | OUTPATIENT
Start: 2022-06-27

## 2022-06-27 RX ORDER — PROPRANOLOL HCL 60 MG
60 CAPSULE, EXTENDED RELEASE 24HR ORAL DAILY
Qty: 90 CAPSULE | Refills: 0 | Status: SHIPPED | OUTPATIENT
Start: 2022-06-27